# Patient Record
Sex: FEMALE | Race: WHITE | NOT HISPANIC OR LATINO | Employment: STUDENT | URBAN - METROPOLITAN AREA
[De-identification: names, ages, dates, MRNs, and addresses within clinical notes are randomized per-mention and may not be internally consistent; named-entity substitution may affect disease eponyms.]

---
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
## 2024-02-02 ENCOUNTER — TELEPHONE (OUTPATIENT)
Dept: FAMILY MEDICINE | Facility: CLINIC | Age: 19
End: 2024-02-02

## 2024-03-25 ENCOUNTER — TELEPHONE (OUTPATIENT)
Dept: FAMILY MEDICINE | Facility: CLINIC | Age: 19
End: 2024-03-25
Payer: COMMERCIAL

## 2024-03-25 NOTE — TELEPHONE ENCOUNTER
Patient Quality Outreach    Patient is due for the following:   Depression  -  PHQ-9 needed and DAP  Physical Well Child Check      Topic Date Due    HPV Vaccine (1 - 3-dose series) Never done    Flu Vaccine (1) 09/01/2023    COVID-19 Vaccine (1 - 2023-24 season) Never done     Chlamydia Screening    Next Steps:   Schedule a Well Child Check    Type of outreach:    Sent letter.      Questions for provider review:    None           Sandra Elias, CMA

## 2024-04-12 ENCOUNTER — HOSPITAL ENCOUNTER (EMERGENCY)
Facility: CLINIC | Age: 19
Discharge: HOME | End: 2024-04-12
Attending: FAMILY MEDICINE | Admitting: FAMILY MEDICINE
Payer: COMMERCIAL

## 2024-04-12 VITALS
HEART RATE: 100 BPM | HEIGHT: 62 IN | DIASTOLIC BLOOD PRESSURE: 84 MMHG | SYSTOLIC BLOOD PRESSURE: 138 MMHG | TEMPERATURE: 99.3 F | OXYGEN SATURATION: 97 % | BODY MASS INDEX: 20.49 KG/M2 | RESPIRATION RATE: 16 BRPM

## 2024-04-12 DIAGNOSIS — Z71.3 RESTRICTED DIET: ICD-10-CM

## 2024-04-12 DIAGNOSIS — R45.851 SUICIDE IDEATION: ICD-10-CM

## 2024-04-12 PROBLEM — F41.1 GENERALIZED ANXIETY DISORDER: Status: ACTIVE | Noted: 2024-04-12

## 2024-04-12 PROBLEM — F33.9 MAJOR DEPRESSION, RECURRENT (H): Status: ACTIVE | Noted: 2024-04-12

## 2024-04-12 LAB
ALBUMIN SERPL BCG-MCNC: 4.7 G/DL (ref 3.5–5.2)
ALBUMIN UR-MCNC: NEGATIVE MG/DL
ALP SERPL-CCNC: 58 U/L (ref 40–150)
ALT SERPL W P-5'-P-CCNC: 16 U/L (ref 0–50)
AMORPH CRY #/AREA URNS HPF: ABNORMAL /HPF
AMPHETAMINES UR QL SCN: NORMAL
ANION GAP SERPL CALCULATED.3IONS-SCNC: 10 MMOL/L (ref 7–15)
APAP SERPL-MCNC: <5 UG/ML (ref 10–30)
APPEARANCE UR: ABNORMAL
AST SERPL W P-5'-P-CCNC: 22 U/L (ref 0–35)
BACTERIA #/AREA URNS HPF: ABNORMAL /HPF
BARBITURATES UR QL SCN: NORMAL
BASOPHILS # BLD AUTO: 0 10E3/UL (ref 0–0.2)
BASOPHILS NFR BLD AUTO: 0 %
BENZODIAZ UR QL SCN: NORMAL
BILIRUB SERPL-MCNC: 0.3 MG/DL
BILIRUB UR QL STRIP: NEGATIVE
BUN SERPL-MCNC: 6.5 MG/DL (ref 6–20)
BZE UR QL SCN: NORMAL
CALCIUM SERPL-MCNC: 9.4 MG/DL (ref 8.6–10)
CANNABINOIDS UR QL SCN: NORMAL
CHLORIDE SERPL-SCNC: 101 MMOL/L (ref 98–107)
COLOR UR AUTO: YELLOW
CREAT SERPL-MCNC: 0.55 MG/DL (ref 0.51–0.95)
DEPRECATED HCO3 PLAS-SCNC: 27 MMOL/L (ref 22–29)
EGFRCR SERPLBLD CKD-EPI 2021: >90 ML/MIN/1.73M2
EOSINOPHIL # BLD AUTO: 0.1 10E3/UL (ref 0–0.7)
EOSINOPHIL NFR BLD AUTO: 2 %
ERYTHROCYTE [DISTWIDTH] IN BLOOD BY AUTOMATED COUNT: 12.4 % (ref 10–15)
ETHANOL SERPL-MCNC: <0.01 G/DL
FENTANYL UR QL: NORMAL
GLUCOSE SERPL-MCNC: 105 MG/DL (ref 70–99)
GLUCOSE UR STRIP-MCNC: NEGATIVE MG/DL
HCG SERPL QL: NEGATIVE
HCT VFR BLD AUTO: 42 % (ref 35–47)
HGB BLD-MCNC: 14.2 G/DL (ref 11.7–15.7)
HGB UR QL STRIP: NEGATIVE
IMM GRANULOCYTES # BLD: 0 10E3/UL
IMM GRANULOCYTES NFR BLD: 0 %
KETONES UR STRIP-MCNC: NEGATIVE MG/DL
LEUKOCYTE ESTERASE UR QL STRIP: NEGATIVE
LIPASE SERPL-CCNC: 22 U/L (ref 13–60)
LYMPHOCYTES # BLD AUTO: 1.3 10E3/UL (ref 0.8–5.3)
LYMPHOCYTES NFR BLD AUTO: 23 %
MAGNESIUM SERPL-MCNC: 2.3 MG/DL (ref 1.7–2.3)
MCH RBC QN AUTO: 30.5 PG (ref 26.5–33)
MCHC RBC AUTO-ENTMCNC: 33.8 G/DL (ref 31.5–36.5)
MCV RBC AUTO: 90 FL (ref 78–100)
MONOCYTES # BLD AUTO: 0.5 10E3/UL (ref 0–1.3)
MONOCYTES NFR BLD AUTO: 8 %
MUCOUS THREADS #/AREA URNS LPF: PRESENT /LPF
NEUTROPHILS # BLD AUTO: 3.7 10E3/UL (ref 1.6–8.3)
NEUTROPHILS NFR BLD AUTO: 66 %
NITRATE UR QL: NEGATIVE
NRBC # BLD AUTO: 0 10E3/UL
NRBC BLD AUTO-RTO: 0 /100
OPIATES UR QL SCN: NORMAL
PCP QUAL URINE (ROCHE): NORMAL
PH UR STRIP: 7 [PH] (ref 5–7)
PHOSPHATE SERPL-MCNC: 3.6 MG/DL (ref 2.5–4.5)
PLATELET # BLD AUTO: 335 10E3/UL (ref 150–450)
POTASSIUM SERPL-SCNC: 3.8 MMOL/L (ref 3.4–5.3)
PROT SERPL-MCNC: 7.7 G/DL (ref 6.4–8.3)
RBC # BLD AUTO: 4.66 10E6/UL (ref 3.8–5.2)
RBC URINE: 0 /HPF
SALICYLATES SERPL-MCNC: <0.3 MG/DL
SODIUM SERPL-SCNC: 138 MMOL/L (ref 135–145)
SP GR UR STRIP: 1.02 (ref 1–1.03)
SQUAMOUS EPITHELIAL: 9 /HPF
UROBILINOGEN UR STRIP-MCNC: 2 MG/DL
WBC # BLD AUTO: 5.6 10E3/UL (ref 4–11)
WBC URINE: 4 /HPF

## 2024-04-12 PROCEDURE — 36415 COLL VENOUS BLD VENIPUNCTURE: CPT | Performed by: FAMILY MEDICINE

## 2024-04-12 PROCEDURE — 93005 ELECTROCARDIOGRAM TRACING: CPT | Performed by: FAMILY MEDICINE

## 2024-04-12 PROCEDURE — 82040 ASSAY OF SERUM ALBUMIN: CPT | Performed by: FAMILY MEDICINE

## 2024-04-12 PROCEDURE — 81001 URINALYSIS AUTO W/SCOPE: CPT | Mod: XU | Performed by: FAMILY MEDICINE

## 2024-04-12 PROCEDURE — 84703 CHORIONIC GONADOTROPIN ASSAY: CPT | Performed by: FAMILY MEDICINE

## 2024-04-12 PROCEDURE — 83735 ASSAY OF MAGNESIUM: CPT | Performed by: FAMILY MEDICINE

## 2024-04-12 PROCEDURE — 99285 EMERGENCY DEPT VISIT HI MDM: CPT | Mod: 25 | Performed by: FAMILY MEDICINE

## 2024-04-12 PROCEDURE — 82077 ASSAY SPEC XCP UR&BREATH IA: CPT | Performed by: FAMILY MEDICINE

## 2024-04-12 PROCEDURE — 85025 COMPLETE CBC W/AUTO DIFF WBC: CPT | Performed by: FAMILY MEDICINE

## 2024-04-12 PROCEDURE — 83690 ASSAY OF LIPASE: CPT | Performed by: FAMILY MEDICINE

## 2024-04-12 PROCEDURE — 99285 EMERGENCY DEPT VISIT HI MDM: CPT | Performed by: FAMILY MEDICINE

## 2024-04-12 PROCEDURE — 80143 DRUG ASSAY ACETAMINOPHEN: CPT | Performed by: FAMILY MEDICINE

## 2024-04-12 PROCEDURE — 80179 DRUG ASSAY SALICYLATE: CPT | Performed by: FAMILY MEDICINE

## 2024-04-12 PROCEDURE — 84100 ASSAY OF PHOSPHORUS: CPT | Performed by: FAMILY MEDICINE

## 2024-04-12 PROCEDURE — 80307 DRUG TEST PRSMV CHEM ANLYZR: CPT | Performed by: FAMILY MEDICINE

## 2024-04-12 PROCEDURE — 93010 ELECTROCARDIOGRAM REPORT: CPT | Performed by: FAMILY MEDICINE

## 2024-04-12 RX ORDER — VENLAFAXINE HYDROCHLORIDE 75 MG/1
75 CAPSULE, EXTENDED RELEASE ORAL DAILY
Status: ON HOLD | COMMUNITY
Start: 2024-01-10 | End: 2024-05-01

## 2024-04-12 ASSESSMENT — ENCOUNTER SYMPTOMS
HEADACHES: 0
FREQUENCY: 0
VOMITING: 0
ABDOMINAL PAIN: 0
FEVER: 0
DIARRHEA: 0
SORE THROAT: 0
CHILLS: 0
CONSTIPATION: 0
SINUS PRESSURE: 0
WHEEZING: 0
COUGH: 0
DYSPHORIC MOOD: 1
BLOOD IN STOOL: 0
DYSURIA: 0
DIAPHORESIS: 0
PALPITATIONS: 0
SHORTNESS OF BREATH: 0
NAUSEA: 0

## 2024-04-12 ASSESSMENT — COLUMBIA-SUICIDE SEVERITY RATING SCALE - C-SSRS
4. HAVE YOU HAD THESE THOUGHTS AND HAD SOME INTENTION OF ACTING ON THEM?: NO
2. HAVE YOU ACTUALLY HAD ANY THOUGHTS OF KILLING YOURSELF IN THE PAST MONTH?: YES
3. HAVE YOU BEEN THINKING ABOUT HOW YOU MIGHT KILL YOURSELF?: YES
5. HAVE YOU STARTED TO WORK OUT OR WORKED OUT THE DETAILS OF HOW TO KILL YOURSELF? DO YOU INTEND TO CARRY OUT THIS PLAN?: YES
1. IN THE PAST MONTH, HAVE YOU WISHED YOU WERE DEAD OR WISHED YOU COULD GO TO SLEEP AND NOT WAKE UP?: YES
6. HAVE YOU EVER DONE ANYTHING, STARTED TO DO ANYTHING, OR PREPARED TO DO ANYTHING TO END YOUR LIFE?: NO

## 2024-04-12 ASSESSMENT — ACTIVITIES OF DAILY LIVING (ADL)
ADLS_ACUITY_SCORE: 35

## 2024-04-12 NOTE — ED TRIAGE NOTES
"Patient reports suicidal ideation with plan \"overdose on pills.\"  Patient reports having session with therapist and being advised to come in.  Patient tearful.  Reports \"restricting food\" for approximately 1 month, losing 15lbs.  States it is both intentional and reports being \"tired without appetite.\"  Some days does not eat at all, other days will eat an \"avocado or egg.\"  No previous suicide attempt, has history of cutting, has not cut self in approximately 2 months     Triage Assessment (Adult)       Row Name 04/12/24 1637          Triage Assessment    Airway WDL WDL        Respiratory WDL    Respiratory WDL WDL        Skin Circulation/Temperature WDL    Skin Circulation/Temperature WDL WDL        Cardiac WDL    Cardiac WDL WDL        Peripheral/Neurovascular WDL    Peripheral Neurovascular WDL WDL        Cognitive/Neuro/Behavioral WDL    Cognitive/Neuro/Behavioral WDL WDL                     "

## 2024-04-12 NOTE — ED PROVIDER NOTES
History     Chief Complaint   Patient presents with    Suicidal     Suicidal ideation     HPI  Mariana Blake is a 18 year old female who presents with a longer standing history of depressed mood and sees a therapist.  On chronic venlafaxine just recently started.  She was seen by her therapist today remotely and was recommended for evaluation in the emergency department for suicidality.  She had thought about taking pills.  These pills that included Tylenol PM.  We discussed that that would be a very dangerous plan and we discussed why.  There are also apparently weapons in the home that can be locked away per her mother.  She is now tried to commit suicide before but she does have a history of cutting behavior and self injury.  She also notes that in the last months she has had in addition to decreased appetite restrictive eating losing as much is 15 pounds.  We have no weights in our system since May 2023 to compare.  She has been feeling more tired and lack of appetite.  Some days she will eat only part of an avocado or egg.  She has not as of yet taken anything to harm herself.  She does use vaping.  She has used marijuana in the past.  She has used alcohol occasionally.  She is not sexually active.  She has not had no triggering events.  There is no significant new stressors at home or at school that would have worsened her current mood.  She has no hallucinations.  She does feel impulses to harm herself by cutting but she is able to resist those impulses.        Wt Readings from Last 5 Encounters:   05/15/23 50.8 kg (112 lb) (23%, Z= -0.75)*   11/02/12 21.6 kg (47 lb 9.9 oz) (16%, Z= -1.01)*   03/14/12 19.1 kg (42 lb 1.7 oz) (8%, Z= -1.43)*   09/09/06 9.979 kg (22 lb) (26%, Z= -0.65)      * Growth percentiles are based on CDC (Girls, 2-20 Years) data.       Growth percentiles are based on WHO (Girls, 0-2 years) data.         Allergies:  No Known Allergies    Problem List:    Patient Active Problem List  "   Diagnosis Date Noted    Inflammation of eyelid 09/09/2006     Priority: Medium     Problem list name updated by automated process. Provider to review          Past Medical History:    Past Medical History:   Diagnosis Date    NO ACTIVE PROBLEMS        Past Surgical History:    Past Surgical History:   Procedure Laterality Date    NO HISTORY OF SURGERY         Family History:    Family History   Problem Relation Age of Onset    Diabetes Father         type one       Social History:  Marital Status:  Single [1]  Social History     Tobacco Use    Smoking status: Never     Passive exposure: Never    Smokeless tobacco: Never   Vaping Use    Vaping status: Never Used   Substance Use Topics    Alcohol use: No    Drug use: No        Medications:    venlafaxine (EFFEXOR XR) 37.5 MG 24 hr capsule  ACETAMINOPHEN  CHILDRENS MOTRIN OR  IBUPROFEN          Review of Systems   Constitutional:  Negative for chills, diaphoresis and fever.   HENT:  Negative for ear pain, sinus pressure and sore throat.    Eyes:  Negative for visual disturbance.   Respiratory:  Negative for cough, shortness of breath and wheezing.    Cardiovascular:  Negative for chest pain and palpitations.   Gastrointestinal:  Negative for abdominal pain, blood in stool, constipation, diarrhea, nausea and vomiting.   Genitourinary:  Negative for dysuria, frequency and urgency.   Skin:  Negative for rash.   Neurological:  Negative for headaches.   Psychiatric/Behavioral:  Positive for dysphoric mood.    All other systems reviewed and are negative.      Physical Exam   BP: 138/84  Pulse: 100  Temp: 99.3  F (37.4  C)  Resp: 16  Height: 157.5 cm (5' 2\")  SpO2: 97 %      Physical Exam  Constitutional:       General: She is in acute distress.      Appearance: She is not diaphoretic.   HENT:      Head: Atraumatic.   Eyes:      Conjunctiva/sclera: Conjunctivae normal.   Cardiovascular:      Rate and Rhythm: Normal rate and regular rhythm.      Heart sounds: No murmur " heard.  Pulmonary:      Effort: Pulmonary effort is normal. No respiratory distress.      Breath sounds: Normal breath sounds. No stridor. No wheezing or rhonchi.   Abdominal:      General: Abdomen is flat. There is no distension.   Musculoskeletal:      Cervical back: Neck supple.      Right lower leg: No edema.      Left lower leg: No edema.   Skin:     Coloration: Skin is not pale.      Findings: No rash.   Neurological:      General: No focal deficit present.      Mental Status: She is alert and oriented to person, place, and time.      Cranial Nerves: No cranial nerve deficit.      Sensory: No sensory deficit.      Motor: No weakness.      Coordination: Coordination normal.   Psychiatric:         Attention and Perception: She is attentive. She does not perceive auditory or visual hallucinations.         Mood and Affect: Mood is depressed. Mood is not anxious or elated. Affect is flat. Affect is not blunt or angry.         Speech: She is communicative. Speech is not rapid and pressured, delayed, slurred or tangential.         Behavior: Behavior is not agitated, slowed, aggressive or hyperactive.         Thought Content: Thought content is not delusional. Thought content includes suicidal ideation. Thought content includes suicidal plan. Thought content does not include homicidal plan.         ED Course        Procedures                EKG Interpretation:      Interpreted by Davion Gambino MD  EKG done at 1711 hrs. demonstrates a sinus rhythm at 75 bpm normal axis.  There is no ST change.  No T wave changes.  Normal R progression and no Q waves.  Normal intervals.  Normal conduction.  No ectopy.  Impression sinus rhythm without significant acute changes.    Critical Care time:  none               Results for orders placed or performed during the hospital encounter of 04/12/24 (from the past 24 hour(s))   CBC with platelets differential    Narrative    The following orders were created for panel order CBC with  platelets differential.  Procedure                               Abnormality         Status                     ---------                               -----------         ------                     CBC with platelets and d...[[id number]]                      Final result                 Please view results for these tests on the individual orders.   Comprehensive metabolic panel   Result Value Ref Range    Sodium 138 135 - 145 mmol/L    Potassium 3.8 3.4 - 5.3 mmol/L    Carbon Dioxide (CO2) 27 22 - 29 mmol/L    Anion Gap 10 7 - 15 mmol/L    Urea Nitrogen 6.5 6.0 - 20.0 mg/dL    Creatinine 0.55 0.51 - 0.95 mg/dL    GFR Estimate >90 >60 mL/min/1.73m2    Calcium 9.4 8.6 - 10.0 mg/dL    Chloride 101 98 - 107 mmol/L    Glucose 105 (H) 70 - 99 mg/dL    Alkaline Phosphatase 58 40 - 150 U/L    AST 22 0 - 35 U/L    ALT 16 0 - 50 U/L    Protein Total 7.7 6.4 - 8.3 g/dL    Albumin 4.7 3.5 - 5.2 g/dL    Bilirubin Total 0.3 <=1.2 mg/dL   Lipase   Result Value Ref Range    Lipase 22 13 - 60 U/L   Magnesium   Result Value Ref Range    Magnesium 2.3 1.7 - 2.3 mg/dL   Phosphorus   Result Value Ref Range    Phosphorus 3.6 2.5 - 4.5 mg/dL   HCG qualitative pregnancy (blood)   Result Value Ref Range    hCG Serum Qualitative Negative Negative   Salicylate level   Result Value Ref Range    Salicylate <0.3   mg/dL   Acetaminophen level   Result Value Ref Range    Acetaminophen <5.0 (L) 10.0 - 30.0 ug/mL   Alcohol level blood   Result Value Ref Range    Alcohol ethyl <0.01 <=0.01 g/dL   CBC with platelets and differential   Result Value Ref Range    WBC Count 5.6 4.0 - 11.0 10e3/uL    RBC Count 4.66 3.80 - 5.20 10e6/uL    Hemoglobin 14.2 11.7 - 15.7 g/dL    Hematocrit 42.0 35.0 - 47.0 %    MCV 90 78 - 100 fL    MCH 30.5 26.5 - 33.0 pg    MCHC 33.8 31.5 - 36.5 g/dL    RDW 12.4 10.0 - 15.0 %    Platelet Count 335 150 - 450 10e3/uL    % Neutrophils 66 %    % Lymphocytes 23 %    % Monocytes 8 %    % Eosinophils 2 %    % Basophils 0 %    %  Immature Granulocytes 0 %    NRBCs per 100 WBC 0 <1 /100    Absolute Neutrophils 3.7 1.6 - 8.3 10e3/uL    Absolute Lymphocytes 1.3 0.8 - 5.3 10e3/uL    Absolute Monocytes 0.5 0.0 - 1.3 10e3/uL    Absolute Eosinophils 0.1 0.0 - 0.7 10e3/uL    Absolute Basophils 0.0 0.0 - 0.2 10e3/uL    Absolute Immature Granulocytes 0.0 <=0.4 10e3/uL    Absolute NRBCs 0.0 10e3/uL       Medications - No data to display    Assessments & Plan (with Medical Decision Making)     MDM:  Mariana Blake is a 18 year old female presenting with suicidal ideation with a plan of taking medications.  These medications would include Tylenol PM which I mention was particularly dangerous and she is does not do that.  She could potentially be home with mother support on a safety plan with more intensive management of both potentially restricted eating as well as suicidality.  Doubtful that hospital stay will be needed.  Will await DEC evaluation.    I spoke with Shavon in DEC .  The patient has a family history of suicidality and her mother and father's family.  They did not know about her suicidality until today.  The patient and her mother and father believe they could keep the patient safe at home without hospital stay by taking away all meds axis and taking away weapons.  Patient would like to try this but understands that she can return at any time for lack of safety and following her safety plan.  She would like to pursue partial hospitalization.  Shavon attempted set this up but there were issues with insurance they will need to contact on Monday.  They understand if there are any barriers they can return to the emergency department or go directly to a facility such as [facility].      I have reviewed the nursing notes.    I have reviewed the findings, diagnosis, plan and need for follow up with the patient.           Medical Decision Making  The patient's presentation was of high complexity (a chronic illness severe  exacerbation, progression, or side effect of treatment).    The patient's evaluation involved:  ordering and/or review of 3+ test(s) in this encounter (see separate area of note for details)    The patient's management necessitated high risk (a decision regarding hospitalization).        New Prescriptions    No medications on file       Final diagnoses:   Suicide ideation - plan for partial hospitalization. yvette is coordinating. see the attached safety plan. do not hesitate to returnm by car or 911 if you are unsafe. remove all access to meds and weapons.    Restricted diet       4/12/2024   [facility] EMERGENCY DEPT       Davion Gambino MD  04/13/24 020

## 2024-04-13 NOTE — CONSULTS
Diagnostic Evaluation Consultation  Crisis Assessment    Patient Name: Mariana Blake  Age:  18 year old  Legal Sex: female  Gender Identity: female  Pronouns:   Race: White  Ethnicity: Not  or   Language: English      Patient was assessed: Virtual: [employer] Crisis Assessment Start Time: 1940 Crisis Assessment Stop Time: 2020  Patient location: [facility] EMERGENCY DEPT                             ED05    Referral Data and Chief Complaint  Mariana Blake presents to the ED with family/friends. Patient is presenting to the ED for the following concerns: Suicidal ideation, Depression, Anxiety, Worsening psychosocial stress.   Factors that make the mental health crisis life threatening or complex are:  Pt is a 18 year old female who presents to ED following recommendation of individual therapist after pt expressed suicidal ideation during session today.  Pt currently endorsing worsening depression, anxiety and suicidal ideation.  Pt admits to thinking of taking an overdose of pills including Tylenol pm that is in the home.  Feels overwhelmed, impaired sleeping, loss of weight, SIB..      Informed Consent and Assessment Methods  Explained the crisis assessment process, including applicable information disclosures and limits to confidentiality, assessed understanding of the process, and obtained consent to proceed with the assessment.  Assessment methods included conducting a formal interview with patient, review of medical records, collaboration with medical staff, and obtaining relevant collateral information from family and community providers when available.  : done     Patient response to interventions: acceptance expressed  Coping skills were attempted to reduce the crisis:  Reading     History of the Crisis   Pt reports mental health struggles for past three years.  Hx of passive suicidal ideation but no attempts.  Has been cutting in past but none for past 2 months.  Feels  "overwhelmed and becomes hopeless.  Hx of individual therapy, medication, day treatment.  No hospitalizations.    Brief Psychosocial History  Family:  Single, Children    Support System:  Parent(s), Sibling(s), Other (specify)  Employment Status:  employed full-time  Source of Income:     Financial Environmental Concerns:     Current Hobbies:  reading  Barriers in Personal Life:  emotional concerns    Significant Clinical History  Current Anxiety Symptoms:  anxious, shortness of breath or racing heart, excessive worry  Current Depression/Trauma:  helplessness, thoughts of death/suicide, impaired decision making, crying or feels like crying, difficulty concentrating, hopelessness, sadness  Current Somatic Symptoms:  excessive worry, anxious  Current Psychosis/Thought Disturbance:     Current Eating Symptoms:  loss of appetite  Chemical Use History:  Alcohol: Social  Opiates: None  Cocaine: None  Marijuana: Occasional  Other Use: None   Past diagnosis:  Anxiety Disorder  Family history:  Anxiety Disorder, Depression, Suicide Attempt  Past treatment:  Individual therapy, Family therapy, Primary Care, Psychiatric Medication Management, Day Treatment  Details of most recent treatment:  Currently seeing individual therapist x2 week for past year, on Effexor  Other relevant history:          Collateral Information  Is there collateral information: Yes     Collateral information name, relationship, phone number:  Kamille Blake  [phone]    What happened today: Called by therapist recommending pt be brought to ED for mental health evaluation     What is different about patient's functioning: Mother states she wasnsn't surprised to hear pt was struggling because she had had a bad day yesterday and \"doesn't do well when she gets overwhelmed\".  Weight loss.     Concern about alcohol/drug use:      What do you think the patient needs:      Has patient made comments about wanting to kill themselves/others: no    If " d/c is recommended, can they take part in safety/aftercare planning:  yes    Additional collateral information:  Discussed supervising pt and locking up all medications     Risk Assessment  [place] Suicide Severity Rating Scale Full Clinical Version:  Suicidal Ideation  Q1 Wish to be Dead (Lifetime): Yes  Q2 Non-Specific Active Suicidal Thoughts (Lifetime): Yes  3. Active Suicidal Ideation with any Methods (Not Plan) Without Intent to Act (Lifetime): Yes  Q4 Active Suicidal Ideation with Some Intent to Act, Without Specific Plan (Lifetime): Yes  Q5 Active Suicidal Ideation with Specific Plan and Intent (Lifetime): Yes  Q6 Suicide Behavior (Lifetime): no     Suicidal Behavior (Lifetime)  Actual Attempt (Lifetime): No  Has subject engaged in non-suicidal self-injurious behavior? (Lifetime): Yes  Interrupted Attempts (Lifetime): No  Aborted or Self-Interrupted Attempt (Lifetime): No  Preparatory Acts or Behavior (Lifetime): Yes  Total Number of Preparatory Acts (Lifetime): 1  Preparatory Acts or Behavior Description (Lifetime): has bottle of tylenol pm in room    [place] Suicide Severity Rating Scale Recent:   Suicidal Ideation (Recent)  Q1 Wished to be Dead (Past Month): yes  Q2 Suicidal Thoughts (Past Month): yes  Q3 Suicidal Thought Method: yes  Q4 Suicidal Intent without Specific Plan: yes  Q5 Suicide Intent with Specific Plan: no  Level of Risk per Screen: high risk  Intensity of Ideation (Recent)  Most Severe Ideation Rating (Past 1 Month): 4  Frequency (Past 1 Month): Daily or almost daily  Duration (Past 1 Month): Less than 1 hour/some of the time  Controllability (Past 1 Month): Can control thoughts with some difficulty  Deterrents (Past 1 Month): Deterrents definitely stopped you from attempting suicide  Reasons for Ideation (Past 1 Month): Mostly to end or stop the pain (You couldn't go on living with the pain or how you were feeling)  Suicidal Behavior (Recent)  Actual Attempt (Past 3 Months): No  Has  subject engaged in non-suicidal self-injurious behavior? (Past 3 Months): Yes  Interrupted Attempts (Past 3 Months): No  Aborted or Self-Interrupted Attempt (Past 3 Months): No  Preparatory Acts or Behavior (Past 3 Months): Yes  Total Number of Preparatory Acts (Past 3 Months): 1    Environmental or Psychosocial Events: other life stressors, work or task failure, social isolation  Protective Factors: Protective Factors: strong bond to family unit, community support, or employment, lives in a responsibly safe and stable environment, good treatment engagement, supportive ongoing medical and mental health care relationships, reality testing ability    Does the patient have thoughts of harming others? Feels Like Hurting Others: no  Previous Attempt to Hurt Others: no  Is the patient engaging in sexually inappropriate behavior?: no    Is the patient engaging in sexually inappropriate behavior?  no        Mental Status Exam   Affect: Appropriate  Appearance: Appropriate  Attention Span/Concentration: Attentive  Eye Contact: Variable    Fund of Knowledge: Appropriate   Language /Speech Content: Fluent  Language /Speech Volume: Normal  Language /Speech Rate/Productions: Normal  Recent Memory: Intact  Remote Memory: Intact  Mood: Depressed, Sad  Orientation to Person: Yes   Orientation to Place: Yes  Orientation to Time of Day: Yes  Orientation to Date: Yes     Situation (Do they understand why they are here?): Yes  Psychomotor Behavior: Normal  Thought Content: Suicidal  Thought Form: Intact     Mini-Cog Assessment  Number of Words Recalled:    Clock-Drawing Test:     Three Item Recall:    Mini-Cog Total Score:       Medication  Psychotropic medications:   Medication Orders - Psychiatric (From admission, onward)      None             Current Care Team  Patient Care Team:  No Ref-Primary, Physician as PCP - Elizabeth Baldwin PA-C as Assigned PCP    Diagnosis  Patient Active Problem List   Diagnosis    Inflammation  of eyelid    Major depression, recurrent (H24)    Generalized anxiety disorder       Primary Problem This Admission  Active Hospital Problems    *Major depression, recurrent (H24)      Generalized anxiety disorder        Clinical Summary and Substantiation of Recommendations   After therapeutic assessment, intervention and aftercare planning by ED care team and LM and in consultation with attending provider, the patient's circumstances and mental state appear appropriate for outpatient programmatic supports.  It is the recommendation of this clinician that pt discharge with plan to schedule Intake appointment for partial hospitalization program.  Pt currently endorsing suicidal ideation without specific plan and intent.  Parents and patient agree to have medications and weapons secured in home with increased supervision of pt.   Voluntary hospitalization was discussed and offered.  Pt to return to ED for IP consideration if suicidal thoughts intensify or include intent and/or specific plan.      Patient coping skills attempted to reduce the crisis:  Reading    Disposition  Recommended disposition: Programmatic Care        Reviewed case and recommendations with attending provider. Attending Name: Dr Gambino       Attending concurs with disposition: yes       Patient and/or validated legal guardian concurs with disposition:   yes       Final disposition:  discharge    Legal status on admission:      Assessment Details   Total duration spent with the patient: 40 min     CPT code(s) utilized: 78491 - Psychotherapy for Crisis - 60 (30-74*) min    Jsoefa Lawrence Psychotherapist  [facility] - Triage & Transition Services  Callback: [phone]

## 2024-04-13 NOTE — ED NOTES
MD Gambino and Dad reviewing discharge instructions. Encourage to clear house of medications and weapons. Verbalized understanding. Encouraged return if symptoms worse, discussed psychiatric hospitals vs tertiary hospitals, verbalized understanding.

## 2024-04-13 NOTE — DISCHARGE INSTRUCTIONS
ICD-10-CM    1. Suicide ideation  R45.851     plan for partial hospitalization. yvette is coordinating. see the attached safety plan. do not hesitate to returnm by car or 911 if you are unsafe. remove all access to meds and weapons.       2. Restricted diet  Z71.3

## 2024-04-13 NOTE — ED NOTES
Mariana MARV Hayden  April 12, 2024  Plan of Care Hand-off Note     Patient Care Path: discharge    Plan for Care:   After therapeutic assessment, intervention and aftercare planning by ED care team and LMHP and in consultation with attending provider, the patient's circumstances and mental state appear appropriate for outpatient programmatic supports.  It is the recommendation of this clinician that pt discharge with plan to schedule Intake appointment for partial hospitalization program.  Pt currently endorsing suicidal ideation without specific plan and intent.  Parents and patient agree to have medications and weapons secured in home with increased supervision of pt.   Voluntary hospitalization was discussed and offered.  Pt to return to ED for IP consideration if suicidal thoughts intensify or include intent and/or specific plan.  No available appt secured today for PHP, pt will be given list of programs to contact.  P to follow up with contacting pt in next few days to offer assistance.    Identified Goals and Safety Issues: Pt with suicidal ideation, hx of SIB    Overview:  Parents Urbano and Briana [phone], [phone]      Legal Status:      Psychiatry Consult:       Updated Dr. Gambino regarding plan of care.           Josefa Lawrence

## 2024-04-14 ENCOUNTER — HEALTH MAINTENANCE LETTER (OUTPATIENT)
Age: 19
End: 2024-04-14

## 2024-04-16 ENCOUNTER — VIRTUAL VISIT (OUTPATIENT)
Dept: BEHAVIORAL HEALTH | Facility: CLINIC | Age: 19
End: 2024-04-16
Payer: COMMERCIAL

## 2024-04-16 ENCOUNTER — TELEPHONE (OUTPATIENT)
Dept: BEHAVIORAL HEALTH | Facility: CLINIC | Age: 19
End: 2024-04-16
Payer: COMMERCIAL

## 2024-04-16 DIAGNOSIS — F33.2 SEVERE RECURRENT MAJOR DEPRESSION WITHOUT PSYCHOTIC FEATURES (H): Primary | ICD-10-CM

## 2024-04-16 ASSESSMENT — PATIENT HEALTH QUESTIONNAIRE - PHQ9
SUM OF ALL RESPONSES TO PHQ QUESTIONS 1-9: 25
10. IF YOU CHECKED OFF ANY PROBLEMS, HOW DIFFICULT HAVE THESE PROBLEMS MADE IT FOR YOU TO DO YOUR WORK, TAKE CARE OF THINGS AT HOME, OR GET ALONG WITH OTHER PEOPLE: EXTREMELY DIFFICULT
SUM OF ALL RESPONSES TO PHQ QUESTIONS 1-9: 25

## 2024-04-16 NOTE — TELEPHONE ENCOUNTER
**Patient Navigator Follow Up**    4/16/2024;    Referral for PHP    Follow up Requests:  Patient Navigator Coordinator Follow-Up Needed: Other:  The patient would like to begin PHP as soon as possible.   She is also at high risk of further decompensation.    She doesn't think that it will be feasible to travel from [place] to [facility].  She would like assistance with either transportation or finding a PHP     I called and spoke to patient about programming.  She said she needs transportation.    I informed her I will have our in-house  Eliana Schaeffer reach out to her to work with her and assist with securing Transportation to programming.    *Referral form completed and Eliana FAIR ([facility]) will help patient with Transportation piece and [place] back    4/17/2024;    Eliana spoke to patient and Mother. See notes in Epic.  Patient will follow up accordingly.      Thank you,     Latanya HARMAN  Patient Navigator

## 2024-04-16 NOTE — TELEPHONE ENCOUNTER
Summary of Patient Care Communication Handoff to Patient Navigator Coordinator    PATIENT'S NAME: Mariana Blake  MRN:   [id number]  :   2005    DATE OF SERVICE: 24    Referral Needed: Yes    Is the patient coming from an inpatient unit? No    What program is this referral for? Adult Mental Health Referral    Level of Care Recommended:  Partial Hospitalization Program (PHP)    Specialty Track Recommendations:  MH Specialty Tracks: Young Adult    Schedule Preferences: Schedule Preference:  No schedule preference (Mornings or Afternoons)    Are there any potential barriers for entrance into programmatic care? Transportation    Specialty Care Coordinator Referral Needed:  No    Mental Health Referral Needed: No    Release of Information Needed:  defer    Faxing Needed: n/a    Follow up Requests:  Patient Navigator Coordinator Follow-Up Needed: Other:  The patient would like to begin PHP as soon as possible.   She is also at high risk of further decompensation.    She doesn't think that it will be feasible to travel from [place] to [facility].  She would like assistance with either transportation or finding a PHP     Comments: DA is almost complete     Radha Smith Psy.D, MANNY        Patient Navigator Coordinator Contact Information  Pool Message: [email or web address] ([phone])   Phone:  [phone]  Fax:  [phone]  Email:  [email or web address]

## 2024-04-16 NOTE — PROGRESS NOTES
"    [facility]         PATIENT'S NAME: Mariana Blake  PREFERRED NAME: Mariana  PRONOUNS:   she / her     MRN: [id number]  : 2005  ADDRESS: [address] [place] [address] [address]  [facility] [address]  ACCT. NUMBER:  [id number]  DATE OF SERVICE: 24  START TIME: 0935  END TIME: 1026  PREFERRED PHONE: [phone]  May we leave a program related message: Yes  EMERGENCY CONTACT: was obtained - Father .  SERVICE MODALITY:  Video Visit:    51 min   Provider verified identity through the following two step process.  Patient provided:  Patient  and Patient address    Telemedicine Visit: The patient's condition can be safely assessed and treated via synchronous audio and visual telemedicine encounter.      Reason for Telemedicine Visit: Patient has requested telehealth visit    Originating Site (Patient Location): Patient's home    Distant Site (Provider Location): Provider Remote Setting- Home Office    Consent:  The patient/guardian has verbally consented to: the potential risks and benefits of telemedicine (video visit) versus in person care; bill my insurance or make self-payment for services provided; and responsibility for payment of non-covered services.     Patient would like the video invitation sent by:  My Chart    Mode of Communication:  Video Conference via [facility]    Distant Location (Provider):  Off-site    As the provider I attest to compliance with applicable laws and regulations related to telemedicine.    UNIVERSAL ADULT Mental Health DIAGNOSTIC ASSESSMENT    Identifying Information:  Patient is a 18 year old,   individual.  Patient was referred for an assessment by [facility] ED and individual therapist.  Patient attended the session with Mom  .    Chief Complaint:   The reason for seeking services at this time is: \" I had an appt with my therapist Friday and she told me to go in \"   The problem(s) began: \"I can't remember.\" Patient has attempted to resolve these " "concerns in the past through therapist and medication, day treatment (one year ago)  .     Used to take venlafaxine - hasn't taken since last week.  States, \"I haven't done anything.  I don't know why I am not taking it.\"    Per ED visit note :   Mariana Blake is a 18 year old female who presents with a longer standing history of depressed mood and sees a therapist.  On chronic venlafaxine just recently started.  She was seen by her therapist today remotely and was recommended for evaluation in the emergency department for suicidality.  She had thought about taking pills.  These pills that included Tylenol PM.  We discussed that that would be a very dangerous plan and we discussed why.  There are also apparently weapons in the home that can be locked away per her mother.  She is now tried to commit suicide before but she does have a history of cutting behavior and self injury.  She also notes that in the last months she has had in addition to decreased appetite restrictive eating losing as much is 15 pounds.  We have no weights in our system since May 2023 to compare.  She has been feeling more tired and lack of appetite.  Some days she will eat only part of an avocado or egg.  She has not as of yet taken anything to harm herself.  She does use vaping.  She has used marijuana in the past.  She has used alcohol occasionally.  She is not sexually active.  She has not had no triggering events.  There is no significant new stressors at home or at school that would have worsened her current mood.  She has no hallucinations.  She does feel impulses to harm herself by cutting but she is able to resist those impulses.     Sees Josué Medeiros for Therapy @[facility] [place], psychiatry @[facility]     Social/Family History:  Patient reported they grew up in  [place], [place].  They were raised by biological parents.  Parents stayed .  She has one sister, 21 " "and one brother, 15.   Patient reported that their childhood was \"normal, I don't know, I don't really remember it.\" Patient described their current relationships with family of origin as \"fine.\"    The patient describes their cultural background as \"I don't know. White.\"  Cultural influences and impact on patient's life structure, values, norms, and healthcare:  none  .  Contextual influences on patient's health include: none noted.  Cultural, Contextual, and socioeconomic factors do not affect the patient's access to services. Patient identified their preferred language to be English. Patient reported they do not  need the assistance of an  or other support involved in therapy.     Patient reported had no significant delays in developmental tasks.   Patient's highest education level was some college. Patient identified the following learning problems: none reported.  Modifications will not be used to assist communication in therapy.   Patient reports they are  able to understand written materials.    Patient reported the following relationship history : has not been in a relationship.  Patient's current relationship status is single.    Patient identified their sexual orientation as : did not discuss.  Patient reported having zero child(dipika). Patient identified mother, father, siblings, and therapist as part of their support system.  Patient identified the quality of these relationships as good.     Patient's current living/housing situation involves  live with parents .  They have always lived with their parents and they report that housing is stable.     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 Has been unable to work since Friday morning.    Patient reports their finances are obtained through employment.  Patient does not identify finances as a current stressor.      Patient reported that they have not been involved with the legal system.   Patient denies being on probation / " parole / under the jurisdiction of the court.    Patient's Strengths and Limitations:  Patient identified the following strengths or resources that will help them succeed in treatment: Latter day / Episcopalian, babs / spirituality, and family support. Things that may interfere with the patient's success in treatment include: transportation concerns.    Assessments:  The following assessments were completed by patient for this visit    PHQ9:       4/6/2023     7:46 AM 5/15/2023     8:21 AM 4/16/2024     8:44 AM   PHQ-9 SCORE   PHQ-9 Total Score MyChart   25 (Severe depression)   PHQ-9 Total Score 21 21 25    25     GAD7:       4/6/2023     7:46 AM 4/16/2024     8:42 AM   KHALIF-7 SCORE   Total Score  16 (severe anxiety)   Total Score 14 16     CAGE-AID:       4/16/2024     8:40 AM   CAGE-AID Total Score   Total Score 0    0   Total Score MyChart 0 (A total score of 2 or greater is considered clinically significant)     PROMIS 10-Global Health (all questions and answers displayed):       4/16/2024     8:40 AM   PROMIS 10   In general, would you say your health is: Good   In general, would you say your quality of life is: Good   In general, how would you rate your physical health? Fair   In general, how would you rate your mental health, including your mood and your ability to think? Poor   In general, how would you rate your satisfaction with your social activities and relationships? Poor   In general, please rate how well you carry out your usual social activities and roles Poor   To what extent are you able to carry out your everyday physical activities such as walking, climbing stairs, carrying groceries, or moving a chair? Moderately   In the past 7 days, how often have you been bothered by emotional problems such as feeling anxious, depressed, or irritable? Always   In the past 7 days, how would you rate your fatigue on average? Very severe   In the past 7 days, how would you rate your pain on average, where 0 means no  pain, and 10 means worst imaginable pain? 5   In general, would you say your health is: 3   In general, would you say your quality of life is: 3   In general, how would you rate your physical health? 2   In general, how would you rate your mental health, including your mood and your ability to think? 1   In general, how would you rate your satisfaction with your social activities and relationships? 1   In general, please rate how well you carry out your usual social activities and roles. (This includes activities at home, at work and in your community, and responsibilities as a parent, child, spouse, employee, friend, etc.) 1   To what extent are you able to carry out your everyday physical activities such as walking, climbing stairs, carrying groceries, or moving a chair? 3   In the past 7 days, how often have you been bothered by emotional problems such as feeling anxious, depressed, or irritable? 5   In the past 7 days, how would you rate your fatigue on average? 5   In the past 7 days, how would you rate your pain on average, where 0 means no pain, and 10 means worst imaginable pain? 5   Global Mental Health Score 6    6   Global Physical Health Score 9    9   PROMIS TOTAL - SUBSCORES 15    15     [place] Suicide Severity Rating Scale (Lifetime/Recent)      4/12/2024     4:46 PM 4/12/2024     8:23 PM 4/12/2024     8:40 PM   [place] Suicide Severity Rating (Lifetime/Recent)   Q1 Wish to be Dead (Lifetime)  Yes    Q2 Non-Specific Active Suicidal Thoughts (Lifetime)  Yes    Q1 Wished to be Dead (Past Month) 1-->yes  1-->yes   Q2 Suicidal Thoughts (Past Month) 1-->yes  1-->yes   Q3 Suicidal Thought Method 1-->yes  1-->yes   Q4 Suicidal Intent without Specific Plan 0-->no  1-->yes   Q5 Suicide Intent with Specific Plan 1-->yes  0-->no   Q6 Suicide Behavior (Lifetime) 0-->no 0-->no    Level of Risk per Screen high risk  high risk   3. Active Suicidal Ideation with any Methods (Not Plan) Without Intent to Act  (Lifetime)  Y    Q4 Active Suicidal Ideation with Some Intent to Act, Without Specific Plan (Lifetime)  Y    Q5 Active Suicidal Ideation with Specific Plan and Intent (Lifetime)  Y    Most Severe Ideation Rating (Past 1 Month)   4   Frequency (Past 1 Month)   4   Duration (Past 1 Month)   2   Controllability (Past 1 Month)   3   Deterrents (Past 1 Month)   1   Reasons for Ideation (Past 1 Month)   4   Actual Attempt (Lifetime)  N    Actual Attempt (Past 3 Months)   N   Has subject engaged in non-suicidal self-injurious behavior? (Lifetime)  Y    Has subject engaged in non-suicidal self-injurious behavior? (Past 3 Months)   Y   Interrupted Attempts (Lifetime)  N    Interrupted Attempts (Past 3 Months)   N   Aborted or Self-Interrupted Attempt (Lifetime)  N    Aborted or Self-Interrupted Attempt (Past 3 Months)   N   Preparatory Acts or Behavior (Lifetime)  Y    Total Number of Preparatory Acts (Lifetime)  1    Preparatory Acts or Behavior Description (Lifetime)  has bottle of tylenol pm in room    Preparatory Acts or Behavior (Past 3 Months)   Y   Total Number of Preparatory Acts (Past 3 Months)   1   Calculated C-SSRS Risk Score (Lifetime/Recent)  Moderate Risk High Risk     LOCUS Worksheet:   LOCUS Worksheet     Name: Mariana Blake MRN: [id number]    : 2005      Gender:  female    PMI:     Provider Name: Radha Mccray PsyD, LP    Provider NPI:  [id number]    Actual level of Care Provided:  Diagnostic Assessment     Service(s) receiving or referred to:  PHP    Reason for Variance:       Rating completed by: Radha Mccray PsyD, LP       I. Risk of Harm:   4      Serious Risk of Harm    II. Functional Status:   4      Serious Impairment    III. Co-Morbidity:   1      No Co-Morbidity    IV - A. Recovery Environment - Level of Stress:   3      Moderately Stress Environment    IV - B. Recovery Environment - Level of Support:   2      Supportive Environment    V. Treatment and Recovery  "History:   3      Moderate to Equivocal Response to Treatment and Recovery Management    VI. Engagement and Recovery Project:   3      Limited Engagement and Recovery       20 Composite Score    Level of Care Recommendation:   20 to 22       Medically Monitored Non-Residential Services    Personal and Family Medical History:  Patient does report a family history of mental health concerns.  Patient reports family history includes Diabetes in her father.  Mom - depression   Dad- anxiety  Aunt and Uncles - anxiety and depression     Patient does report Mental Health Diagnosis and/or Treatment.  Patient reported the following previous diagnoses which include(s): an anxiety disorder; depression .  Patient reported symptoms began : per Mom, around age 16.  Patient has received mental health services in the past:  therapy  .  Psychiatric Hospitalizations: none.  Patient denies a history of civil commitment.      Currently, patient is receiving other mental health services.  These include psychotherapy with at [facility]  .       Patient has had a physical exam to rule out medical causes for current  symptoms.  Date of last physical exam was within the past year. Client was encouraged to follow up with PCP if symptoms were to develop. The patient  goes to [facility] for their care but was recently seen by [facility] provider              .  Patient reports no current medical concerns and no current dental concerns.  Patient denies any issues with pain..   There are significant appetite / nutritional concerns / weight changes. These may include: low appetite and loss of weight. Patient reports the following sleep concerns:  \"I am mostly sleeping\" .   Patient does not report a history of head injury / trauma / cognitive impairment.      Patient reports current meds as:   Current Outpatient Medications   Medication Sig Dispense Refill    ACETAMINOPHEN       CHILDRENS MOTRIN OR None Entered (Patient not taking: " "Reported on 4/6/2023)      IBUPROFEN       venlafaxine (EFFEXOR XR) 37.5 MG 24 hr capsule Take 75 mg by mouth       No current facility-administered medications for this visit.       Medication Adherence:  Patient reports not taking psychiatric medications as prescribed. Client states reason for medication non-adherence as \"I don't know.  I am not doing anything\" . Strategies for addressing obstacles to medication adherence include f/up with psychiatry, ask for help / support with medications . Client  .    Patient Allergies:  No Known Allergies    Medical History:    Past Medical History:   Diagnosis Date    NO ACTIVE PROBLEMS          Current Mental Status Exam:   Appearance:  Disheveled    Eye Contact:  Fair   Psychomotor:  Retarded (Slowed)       Gait / station:  Did not observe  Attitude / Demeanor: Cooperative  Indifferent  Speech      Rate / Production: Slow       Volume:  Soft  volume      Language:  no obvious problem  Mood:   Depressed  Sad  Dysphoric  Affect:   Blunted    Thought Content: Clear   Thought Process: Coherent       Associations: No loosening of associations  Insight:   Fair   Judgment:  Intact   Orientation:  All  Attention/concentration: Fair    Substance Use:   Patient did not report a family history of substance use concerns; see medical history section for details.  Patient has not received chemical dependency treatment in the past.  Patient has not ever been to detox.      Patient is not currently receiving any chemical dependency treatment.           Substance History of use Age of first use Date of last use     Pattern and duration of use (include amounts and frequency)   Alcohol used in the past   15 03/15/24 Has not used since 3/15/2024   Cannabis   never used          Amphetamines   never used        Cocaine/crack    never used          Hallucinogens never used            Inhalants never used            Heroin never used            Other Opiates never used        Benzodiazepine   " never used        Barbiturates never used        Over the counter meds never used        Caffeine used in the past 14      Nicotine  never used        Other substances not listed above:  Identify:  never used          Patient reported the following problems as a result of their substance use: no problems, not applicable.    Substance Use: No symptoms    Based on the negative CAGE score and clinical interview there  are not indications of drug or alcohol abuse.    Significant Losses / Trauma / Abuse / Neglect Issues:   Patient did not serve in the .  There are indications or report of significant loss, trauma, abuse or neglect issues related to: are no indications and client denies any losses, trauma, abuse, or neglect concerns.  Concerns for possible neglect are not present.     Safety Assessment:   Patient denies current homicidal ideation and behaviors.  Patient denies current self-injurious ideation and behaviors.  *Last time she self injured by cutting was two months ago  Patient denied risk behaviors associated with substance use.   Patient denies any high risk behaviors associated with mental health symptoms.  Patient reports the following current concerns for their personal safety: None.  Patient reports there are firearms in the house.     yes, they are secured.     History of Safety Concerns:  Patient denied a history of homicidal ideation.     Patient denied a history of personal safety concerns.    Patient denied a history of assaultive behaviors.    Patient denied a history of sexual assault behaviors.     Patient denied a history of risk behaviors associated with substance use.  Patient denies any history of high risk behaviors associated with mental health symptoms.  Patient reports the following protective factors: dedication to family or friends    Risk Plan:  See Recommendations for Safety and Risk Management Plan    Review of Symptoms per patient report:   Depression: Change in sleep, Lack  of interest, Excessive or inappropriate guilt, Change in energy level, Difficulties concentrating, Change in appetite, Psychomotor slowing or agitation, Suicidal ideation, Feelings of hopelessness, Feelings of helplessness, Low self-worth, Ruminations, Irritability, Feeling sad, down, or depressed, Withdrawn, Poor hygeine, Frequent crying, and Anger outbursts  Gricelda:  No Symptoms  Psychosis: No Symptoms  Anxiety: Excessive worry, Nervousness, Physical complaints, such as headaches, stomachaches, muscle tension, Social anxiety, Sleep disturbance, Psychomotor agitation, Ruminations, Poor concentration, Irritability, and Anger outbursts  Panic:  Panic symptoms include the following and occur 2x in past one month and last approximately 15 minutes:, Palpitations, Shortness of breath, and Sense of impending doom  Post Traumatic Stress Disorder:  No Symptoms   Eating Disorder: Restriction and Weight change  ADD / ADHD:  Poor task completion and Poor organizational skills  Conduct Disorder: No symptoms  Autism Spectrum Disorder: No symptoms  Obsessive Compulsive Disorder: Checking    Patient reports the following compulsive behaviors and treatment history: Picking - has not had treatment..      Weight loss - 95 lb   5'2         Diagnostic Criteria:   Major Depressive Disorder  CRITERIA (A-C) REPRESENT A MAJOR DEPRESSIVE EPISODE - SELECT THESE CRITERIA  A) Recurrent episode(s) - symptoms have been present during the same 2-week period and represent a change from previous functioning 5 or more symptoms (required for diagnosis)   - Depressed mood. Note: In children and adolescents, can be irritable mood.     - Diminished interest or pleasure in all, or almost all, activities.    - Significant weight loss.    - Increased sleep.    - Psychomotor activity retardation.    - Fatigue or loss of energy.    - Feelings of worthlessness or inappropriate and excessive guilt.    - Diminished ability to think or concentrate, or  indecisiveness.    - Recurrent thoughts of death (not just fear of dying), recurrent suicidal ideation without a specific plan, or a suicide attempt or a specific plan for committing suicide.   B) The symptoms cause clinically significant distress or impairment in social, occupational, or other important areas of functioning  C) The episode is not attributable to the physiological effects of a substance or to another medical condition  D) The occurence of major depressive episode is not better explained by other thought / psychotic disorders  E) There has never been a manic episode or hypomanic episode    Functional Status:  Patient reports the following functional impairments:  childcare / parenting, health maintenance, home life with interacting with others, managing responsibilities, management of the household and or completion of tasks, organization, relationship(s), self-care, social interactions, and work / vocational responsibilities.     Programmatic care:  Current LOCUS was assigned and patient needs the following level of care based on score 20  .    Clinical Summary:  1. Psychosocial, Cultural and Contextual Factors: n/a   2. Principal DSM5 Diagnoses  (Sustained by DSM5 Criteria Listed Above):   296.33 (F33.2) Major Depressive Disorder, Recurrent Episode, Severe _.  3. Other Diagnoses that is relevant to services:   300.00 (F41.9) Unspecified Anxiety Disorder.  4. Provisional Diagnosis:  n/a    5. Prognosis: Expect Improvement and Relieve Acute Symptoms.  6. Likely consequences of symptoms if not treated: decline in functioning, increase in symptoms, .  7. Client strengths include:  has a previous history of therapy, support of family, friends and providers, and work history .     Recommendations:     1. Plan for Safety and Risk Management:   Safety and Risk: Patient agreed to use safety plan developed on 4/12/2024 if any safety concerns arise.  She has a copy of the plan      Report to child / adult  protection services was NA.     2. Patient's identified no identified cultural concerns.     3. Initial Treatment will focus on:    Depressed Mood - safety and symptom management / improvement, return to baseline  .     4. Resources/Service Plan:    services are not indicated.   Modifications to assist communication are not indicated.   Additional disability accommodations are not indicated.      5. Collaboration:   Collaboration / coordination of treatment will be initiated with the following  support professionals: defer to treatment team, consider collaboration with the patient's individual therapist.      6.  Referrals:   The following referral(s) will be initiated: Partial Hospitalization Program.       A Release of Information has been obtained for the following: defer to treatment team .     Clinical Substantiation/medical necessity for the above recommendations:  Patient is a 19 yo female who presented for a diagnostic assessment in order to participate in programmatic care.  She was seen in the ED for increasing suicidal ideation on 4/12/2024.    The patient agrees to follow safety plan and has no active intention or plan to harm herself.   She is experiencing significant functional impairment in the areas of medication compliance, completing IADL, attending work and interacting with others.  Outpatient treatment interventions have not adequately addressed the patient's symptoms or functional impairments and therefore a higher level of care is needed.    The patient meets the criteria for PHP and this is recommended in order to prevent further decompensation and improve functional status.     7. ELIZABETH:    ELIZABETH:  Did not discuss.     8. Records:   These were reviewed at time of assessment.   Information in this assessment was obtained from the medical record and  provided by patient and family who is a fair historian.    Patient will have open access to their mental health medical record.    9.    Interactive Complexity: No    10. Safety Plan:   [place] Safety Plan      Creation Date: 4/12/24       Step 1: Warning signs:    Warning Signs    Feeling hopeless, helpless, overwhelmed    Urge to self-harm      Step 2: Internal coping strategies - Things I can do to take my mind off my problems without contacting another person:    Strategies    Reading.    try mindfulness activities (see handouts)      Step 3: People and social settings that provide distraction:    Name Contact Information    Aunts, Uncles, Sister          Step 4: People whom I can ask for help during a crisis:    Name Contact Information    Sister     Parents       Step 5: Professionals or agencies I can contact during a crisis:    Clinician/Agency Name Phone Emergency Contact    [facility]        Suicide Prevention Lifeline Phone: Call or Text 926  Crisis Text Line: Text HOME to 111728     Step 6: Making the environment safer (plan for lethal means safety):   Tylenol pm in home - parents and pt to find medications in home and put in locked cabinet     Optional: What is most important to me and worth living for?:      Khalida Safety Plan. Guerda Roberts and Philip Uriostegui. Used with permission of the authors.         Provider Name/ Credentials:  Radha Mccray PsyD    April 16, 2024

## 2024-04-17 ENCOUNTER — TELEPHONE (OUTPATIENT)
Dept: BEHAVIORAL HEALTH | Facility: CLINIC | Age: 19
End: 2024-04-17
Payer: COMMERCIAL

## 2024-04-17 NOTE — TELEPHONE ENCOUNTER
[facility]       Referral Date: 4/16/2024    Reason for Referral:  Transportation services    Referral Status: (urgent or general)  Urgent    Method of Communication:   Phone call to patient    Plan or goal of contact/ previous contact information:   X1 phone call    Follow up required:   No - unless patient is unable to secure transportation to and from [facility] programming with given resources.     Work done on case:   SW spoke with patient about transportation resources that may be able to assist with getting her to and from [facility] programming. SW recommended patient contact her insurance to inquire about transportation services. SW provided information on a few other options. Patient will contact SW if transportation is unavailable and/or if she needs external programming options.     Important Information and next steps:   [employer] Transportation  https://www.[employer].org/[employer]--[facility]--[employer]-medical-transportation-(mt)/[email or web address]?zcpbfr=84728    [employer]  https://[employer] Median?utm_source=Sailthru&utm_medium=cpc&utm_campaign=Search-Nonbrand-Health-GLB&utm_keyword=&matchtype=&network=&device=&placement=&utm_adgroup=hospital-transportation-service&utm_content=health&gad_source=1&[id number]    Transit Assistance Program (TAP)  https://www.metrotransit.org/tap-[email or web address]          GILBERTO Aguirre   - [facility]  [facility]  [email or web address]  [phone]

## 2024-04-24 ENCOUNTER — TELEPHONE (OUTPATIENT)
Dept: FAMILY MEDICINE | Facility: CLINIC | Age: 19
End: 2024-04-24
Payer: COMMERCIAL

## 2024-04-24 NOTE — TELEPHONE ENCOUNTER
Patient returned PHQ9 sent by MA 2/2/24.  Score high.  Seeing behavioral health.  L/M for Miley Kanika to call.  Patient states she is trying to get into treatment facility for this currently and her therapist is helping her.   Therapist is at [facility].   is currently safe.  Lives with parents.  Mom got on the phone and states she took a week off of work.  States she has been trying to get her in a treatment program since Friday and no luck and very frustrated.  States TREVINO has done nothing to follow-up since being to the ER.  States she was able to go home because her parents were able to be with her for the weekend and states they were told they would be called the next day.  Did intake appt last Tuesday.  Program called them back and there was one spot in the program and they had to arrange transportation.  Insurance does not have transportation.   called [facility] with other transportation options.  Mom then got her her parents arranged to drive [facility] and mom messaged SW and Shirlene has L/M's and they have not heard back since last Wednesday.  Kristiegiovannajhony also messaged Cindy 4/16/24 and has not heard response on [employer] message.  Mom states her daughter needs help urgently and is not getting help.  I did give mom URIEL [facility] name to look up and see if she felt that would be a helpful option.  Advised I would follow-up tomorrow when behavioral health available.  Mom agrees with plan.  Routing to Chi and triage for follow-up tomorrow.  Farhana Shirley RN             Phq-9 (Phq-9)-Developed By Jo Lindo,Norma Caballero,Jan Izquierdo And Colleagues,With An Educational Chace From [employer] 2002.    Question 4/23/2024  6:42 PM CDT - Filed by Patient   1. Little interest or pleasure in doing things Nearly every day   2.  Feeling down, depressed, or hopeless Nearly every day   3.  Trouble falling or staying asleep, or sleeping too much Nearly every day   4.   Feeling tired or having little energy Nearly every day   5.  Poor appetite or overeating Nearly every day   6.  Feeling bad about yourself - or that you are a failure or have let yourself or your family down Nearly every day   7.  Trouble concentrating on things, such as reading the newspaper or watching television More than half the days   8.  Moving or speaking so slowly that other people could have noticed. Or the opposite - being so fidgety or restless that you have been moving around a lot more than usual Several days   9.  Thoughts that you would be better off dead, or of hurting yourself in some way Nearly every day   If you checked off any problems, how difficult have these problems made it for you to do your work, take care of things at home, or get along with other people? Extremely difficult   PHQ9 TOTAL SCORE (range: 0 - 27) 24 (Severe depression)

## 2024-04-25 ENCOUNTER — HOSPITAL ENCOUNTER (OUTPATIENT)
Facility: CLINIC | Age: 19
Setting detail: OBSERVATION
Discharge: HOSPICE | End: 2024-04-26
Attending: EMERGENCY MEDICINE | Admitting: EMERGENCY MEDICINE
Payer: COMMERCIAL

## 2024-04-25 ENCOUNTER — TELEPHONE (OUTPATIENT)
Dept: BEHAVIORAL HEALTH | Facility: CLINIC | Age: 19
End: 2024-04-25

## 2024-04-25 DIAGNOSIS — F33.2 SEVERE EPISODE OF RECURRENT MAJOR DEPRESSIVE DISORDER, WITHOUT PSYCHOTIC FEATURES (H): ICD-10-CM

## 2024-04-25 DIAGNOSIS — R45.851 SUICIDAL IDEATION: ICD-10-CM

## 2024-04-25 PROBLEM — F32.9 MAJOR DEPRESSIVE DISORDER WITH CURRENT ACTIVE EPISODE, UNSPECIFIED DEPRESSION EPISODE SEVERITY, UNSPECIFIED WHETHER RECURRENT: Status: ACTIVE | Noted: 2024-04-25

## 2024-04-25 LAB
ALBUMIN SERPL BCG-MCNC: 4.3 G/DL (ref 3.5–5.2)
ALP SERPL-CCNC: 50 U/L (ref 40–150)
ALT SERPL W P-5'-P-CCNC: 12 U/L (ref 0–50)
AMPHETAMINES UR QL SCN: NORMAL
ANION GAP SERPL CALCULATED.3IONS-SCNC: 10 MMOL/L (ref 7–15)
AST SERPL W P-5'-P-CCNC: 17 U/L (ref 0–35)
BARBITURATES UR QL SCN: NORMAL
BASOPHILS # BLD AUTO: 0 10E3/UL (ref 0–0.2)
BASOPHILS NFR BLD AUTO: 0 %
BENZODIAZ UR QL SCN: NORMAL
BILIRUB SERPL-MCNC: 0.6 MG/DL
BUN SERPL-MCNC: 7.7 MG/DL (ref 6–20)
BZE UR QL SCN: NORMAL
CALCIUM SERPL-MCNC: 9.4 MG/DL (ref 8.6–10)
CANNABINOIDS UR QL SCN: NORMAL
CHLORIDE SERPL-SCNC: 104 MMOL/L (ref 98–107)
CREAT SERPL-MCNC: 0.57 MG/DL (ref 0.51–0.95)
DEPRECATED HCO3 PLAS-SCNC: 26 MMOL/L (ref 22–29)
EGFRCR SERPLBLD CKD-EPI 2021: >90 ML/MIN/1.73M2
EOSINOPHIL # BLD AUTO: 0 10E3/UL (ref 0–0.7)
EOSINOPHIL NFR BLD AUTO: 0 %
ERYTHROCYTE [DISTWIDTH] IN BLOOD BY AUTOMATED COUNT: 12.3 % (ref 10–15)
FENTANYL UR QL: NORMAL
GLUCOSE SERPL-MCNC: 93 MG/DL (ref 70–99)
HCG UR QL: NEGATIVE
HCT VFR BLD AUTO: 38.2 % (ref 35–47)
HGB BLD-MCNC: 12.6 G/DL (ref 11.7–15.7)
IMM GRANULOCYTES # BLD: 0 10E3/UL
IMM GRANULOCYTES NFR BLD: 0 %
LYMPHOCYTES # BLD AUTO: 2.6 10E3/UL (ref 0.8–5.3)
LYMPHOCYTES NFR BLD AUTO: 40 %
MCH RBC QN AUTO: 30.1 PG (ref 26.5–33)
MCHC RBC AUTO-ENTMCNC: 33 G/DL (ref 31.5–36.5)
MCV RBC AUTO: 91 FL (ref 78–100)
MONOCYTES # BLD AUTO: 0.4 10E3/UL (ref 0–1.3)
MONOCYTES NFR BLD AUTO: 6 %
NEUTROPHILS # BLD AUTO: 3.5 10E3/UL (ref 1.6–8.3)
NEUTROPHILS NFR BLD AUTO: 54 %
NRBC # BLD AUTO: 0 10E3/UL
NRBC BLD AUTO-RTO: 0 /100
OPIATES UR QL SCN: NORMAL
PCP QUAL URINE (ROCHE): NORMAL
PLATELET # BLD AUTO: 337 10E3/UL (ref 150–450)
POTASSIUM SERPL-SCNC: 4.3 MMOL/L (ref 3.4–5.3)
PROT SERPL-MCNC: 6.9 G/DL (ref 6.4–8.3)
RBC # BLD AUTO: 4.18 10E6/UL (ref 3.8–5.2)
SODIUM SERPL-SCNC: 140 MMOL/L (ref 135–145)
WBC # BLD AUTO: 6.5 10E3/UL (ref 4–11)

## 2024-04-25 PROCEDURE — 81025 URINE PREGNANCY TEST: CPT | Performed by: NURSE PRACTITIONER

## 2024-04-25 PROCEDURE — 80307 DRUG TEST PRSMV CHEM ANLYZR: CPT | Performed by: NURSE PRACTITIONER

## 2024-04-25 PROCEDURE — 36415 COLL VENOUS BLD VENIPUNCTURE: CPT | Performed by: NURSE PRACTITIONER

## 2024-04-25 PROCEDURE — 99223 1ST HOSP IP/OBS HIGH 75: CPT | Mod: 95 | Performed by: NURSE PRACTITIONER

## 2024-04-25 PROCEDURE — 250N000013 HC RX MED GY IP 250 OP 250 PS 637: Performed by: NURSE PRACTITIONER

## 2024-04-25 PROCEDURE — 85004 AUTOMATED DIFF WBC COUNT: CPT | Performed by: NURSE PRACTITIONER

## 2024-04-25 PROCEDURE — 82247 BILIRUBIN TOTAL: CPT | Performed by: NURSE PRACTITIONER

## 2024-04-25 PROCEDURE — 99285 EMERGENCY DEPT VISIT HI MDM: CPT | Mod: 25

## 2024-04-25 PROCEDURE — G0378 HOSPITAL OBSERVATION PER HR: HCPCS

## 2024-04-25 RX ORDER — IBUPROFEN 600 MG/1
600 TABLET, FILM COATED ORAL EVERY 6 HOURS PRN
Status: DISCONTINUED | OUTPATIENT
Start: 2024-04-25 | End: 2024-04-26 | Stop reason: HOSPADM

## 2024-04-25 RX ORDER — HYDROXYZINE HYDROCHLORIDE 50 MG/1
50 TABLET, FILM COATED ORAL EVERY 6 HOURS PRN
Status: DISCONTINUED | OUTPATIENT
Start: 2024-04-25 | End: 2024-04-26 | Stop reason: HOSPADM

## 2024-04-25 RX ORDER — ONDANSETRON 4 MG/1
4 TABLET, ORALLY DISINTEGRATING ORAL EVERY 6 HOURS PRN
Status: DISCONTINUED | OUTPATIENT
Start: 2024-04-25 | End: 2024-04-26 | Stop reason: HOSPADM

## 2024-04-25 RX ORDER — TRAZODONE HYDROCHLORIDE 50 MG/1
50 TABLET, FILM COATED ORAL
Status: DISCONTINUED | OUTPATIENT
Start: 2024-04-25 | End: 2024-04-26 | Stop reason: HOSPADM

## 2024-04-25 RX ORDER — LANOLIN ALCOHOL/MO/W.PET/CERES
3 CREAM (GRAM) TOPICAL
Status: DISCONTINUED | OUTPATIENT
Start: 2024-04-25 | End: 2024-04-26 | Stop reason: HOSPADM

## 2024-04-25 RX ORDER — ESCITALOPRAM OXALATE 5 MG/1
5 TABLET ORAL DAILY
Status: DISCONTINUED | OUTPATIENT
Start: 2024-04-25 | End: 2024-04-26 | Stop reason: HOSPADM

## 2024-04-25 RX ORDER — ACETAMINOPHEN 325 MG/1
650 TABLET ORAL EVERY 4 HOURS PRN
Status: DISCONTINUED | OUTPATIENT
Start: 2024-04-25 | End: 2024-04-26 | Stop reason: HOSPADM

## 2024-04-25 RX ORDER — OLANZAPINE 5 MG/1
5-10 TABLET, ORALLY DISINTEGRATING ORAL 2 TIMES DAILY PRN
Status: DISCONTINUED | OUTPATIENT
Start: 2024-04-25 | End: 2024-04-26 | Stop reason: HOSPADM

## 2024-04-25 RX ORDER — OLANZAPINE 10 MG/2ML
10 INJECTION, POWDER, FOR SOLUTION INTRAMUSCULAR 2 TIMES DAILY PRN
Status: DISCONTINUED | OUTPATIENT
Start: 2024-04-25 | End: 2024-04-26 | Stop reason: HOSPADM

## 2024-04-25 RX ADMIN — ESCITALOPRAM OXALATE 5 MG: 5 TABLET, FILM COATED ORAL at 21:33

## 2024-04-25 ASSESSMENT — ACTIVITIES OF DAILY LIVING (ADL)
ADLS_ACUITY_SCORE: 35

## 2024-04-25 NOTE — ED TRIAGE NOTES
Pt presents with Mother with complaint of suicidal ideation without plan. Pt stopped Effexor abruptly 2 weeks and was evaluated for similar symptoms at [facility]. Pt was discharged from ED. Pt contracts for safety and is asking for Empath unit.

## 2024-04-25 NOTE — ED PROVIDER NOTES
"  History     Chief Complaint:  Suicidal       HPI   Mariana Blake is a 18 year old female who presents with suicidal ideation over the past 2 weeks. She was seen in the ED at [facility] on 4/12, where she had been experiencing similar symptoms. After her discharge, she stopped taking her Effexor altogether, and has not been on any medications since. Regarding her suicidal thoughts, she does endorse various plans, but no intent to act on them. She lives at home with her parents, who do own guns that are locked away. She has been dealing with anxiety and depression for the past 4 years, and is currently seeing a therapist. No previous suicide attempts, recent acts of self-harm, homicidal ideation, hallucinations, cough, fever, or sore throat. No alcohol, tobacco, or illicit drug/marijuana use.     Independent Historian:   None - Patient Only    Review of External Notes:   I reviewed medical records from: ED visit at [facility] on 4/12/24 for similar complaints    Medications:    Effexor (not taking)    Past Medical History:    Anxiety  Depression  Suicidal ideation     Physical Exam   Patient Vitals for the past 24 hrs:   BP Temp Temp src Pulse Resp SpO2 Height Weight   04/25/24 1822 112/74 98  F (36.7  C) Oral 54 18 -- 1.575 m (5' 2\") 45 kg (99 lb 4.8 oz)   04/25/24 1702 110/74 97.3  F (36.3  C) Temporal 69 18 100 % -- --      Physical Exam  Constitutional: Well developed, nontox appearance  Head: Atraumatic.   Neck:  no stridor  Eyes: no scleral icterus  Cardiovascular: RRR, 2+ bilat radial pulses  Pulmonary/Chest: nml resp effort  Ext: Warm, well perfused, no edema  Neurological: A&O, symmetric facies, moves ext x4  Skin: Skin is warm and dry.   Psychiatric: Endorses SI, denies HI, does not appear to be responding to internal stimuli, tearful, somewhat flattened affect  Nursing note and vitals reviewed.    Emergency Department Course     Assessments/Consultations/Discussion of Management or Tests:  ED Course " as of 24 1847   Thu 2024   171 I evaluated the patient.      Social Determinants of Health affecting care:   Social determinants affecting patient's health include: none    Disposition:  The patient was transferred to [facility].     Impression & Plan    CMS Diagnoses: None    MIPS (If applicable):  N/A    Medical Decision Makin year old female presenting w/ suicidal ideation    DDx includes psychiatric disorder NOS, personality disorder NOS, MDD, KHALIF, adjustment disorder.  Doubt acute psychosis given history and physical exam. Given the patient's history and symptomatology, I think it would be appropriate to have them evaluated in the empath unit.  Pt is medically clear given normal vital signs and no other complaints.   Pt counseled on the disposition and diagnosis.  They are understanding and agreeable to plan. Patient transferred to the empath unit in stable condition.       Diagnosis:    ICD-10-CM    1. Suicidal ideation  R45.851       2. Major depressive disorder with current active episode, unspecified depression episode severity, unspecified whether recurrent  F32.9          Scribe Disclosure:  I, HALLIE POSADA, am serving as a scribe at 5:05 PM on 2024 to document services personally performed by Don Christiansen MD based on my observations and the provider's statements to me.          Don Christiansen MD  24

## 2024-04-25 NOTE — TELEPHONE ENCOUNTER
Would advise [facility] today for evaluation at [facility]. Patient could also go to [facility] ED for intake and evaluation.  Elizabeth Dean PA-C

## 2024-04-25 NOTE — ED NOTES
[facility]  ED to EMPATH Checklist:      Goal for EMPATH: Suicidality    Current Behavior: Flat Affect and Sad    Safety Concerns: Suicidal, no plan    Legal Hold Status: Voluntary    Medically Cleared by ED provider: Yes    Patient Therapeutically Searched: Not searched - Currently in triage    Belongings: Remain with patient    Independent Ambulation at Baseline: Yes/No: Yes    Participates in Care/Conversation: Yes/No: Yes    Patient Informed about EMPATH: Yes/No: Yes    DEC: Ordered and pending    Patient Ready to be Transferred to EMPATH? Yes/No: Yes

## 2024-04-25 NOTE — TELEPHONE ENCOUNTER
Spoke with patient. Relayed provider's recommendations. Patient denies a plan for self harm and agrees to go to [facility] at [facility]. Patient is waiting for mother to return from work as she does not want to drive herself.     Contracts for safety.    Belen Schafer RN

## 2024-04-25 NOTE — PROGRESS NOTES
18 year old female received from Triage for depression with suicidal ideation. Pt reports she has been feeling very overwhelmed for the past two weeks. She reports she has persistent suicidal thoughts and has felt urges to hurt herself. Pt states two weeks ago she had a therapist visit online and they recommended she go to the ER. Pt states she went to the ER, they discharged her to her parents and has been trying to get into a PHP program but has not heard back from them. Pt states her mother was able to take a week off of work and stay with her but mother returned to work this week. Pt states she has felt depressed for a long time but the feelings have increased in the past month. Pt states she has suicidal thoughts and states that with every thought she thinks of a plan. Pt states she has thoughts to overdose on medications found in the house and states she has conducted research online. Pt denies any intent and states she feels safe in the hospital. Pt states she used to self harm by cutting on upper thigh with a razor blade but has not engaged in SIB in two or three months. Pt reports she has been sleeping in her parents room as a way to keep safe. Pt denies any stressors. She denies any previous SA. She denies any HI/Donahue. Pt denies the use of tobacco, alcohol or drugs. Pt reports she is not on any medications at home. She reports she has had various medication trials but does not feel any have worked for her. She reports she stopped taking Effexor two weeks ago after she felt overwhelmed and stayed in bed for a couple of days and did not get her medication from her car.     Nursing and risk assessments completed.  Assessments reviewed with LMHP and physician. Video monitoring in progress, patient informed.  Admission information reviewed with patient. Patient given a tour of [facility] and instructions on using the facility. Questions regarding [facility] addressed. Pt search completed and belongings inventoried.

## 2024-04-26 ENCOUNTER — HOSPITAL ENCOUNTER (INPATIENT)
Facility: CLINIC | Age: 19
LOS: 5 days | Discharge: HOME | DRG: 885 | End: 2024-05-01
Attending: PSYCHIATRY & NEUROLOGY | Admitting: PSYCHIATRY & NEUROLOGY
Payer: COMMERCIAL

## 2024-04-26 ENCOUNTER — TELEPHONE (OUTPATIENT)
Dept: BEHAVIORAL HEALTH | Facility: CLINIC | Age: 19
End: 2024-04-26
Payer: COMMERCIAL

## 2024-04-26 VITALS
HEART RATE: 72 BPM | RESPIRATION RATE: 18 BRPM | SYSTOLIC BLOOD PRESSURE: 114 MMHG | TEMPERATURE: 97.4 F | OXYGEN SATURATION: 100 % | BODY MASS INDEX: 18.27 KG/M2 | DIASTOLIC BLOOD PRESSURE: 71 MMHG | HEIGHT: 62 IN | WEIGHT: 99.3 LBS

## 2024-04-26 DIAGNOSIS — F32.9 MAJOR DEPRESSIVE DISORDER WITH CURRENT ACTIVE EPISODE, UNSPECIFIED DEPRESSION EPISODE SEVERITY, UNSPECIFIED WHETHER RECURRENT: Primary | ICD-10-CM

## 2024-04-26 DIAGNOSIS — K59.04 CHRONIC IDIOPATHIC CONSTIPATION: ICD-10-CM

## 2024-04-26 DIAGNOSIS — F41.1 GENERALIZED ANXIETY DISORDER: ICD-10-CM

## 2024-04-26 LAB
TSH SERPL DL<=0.005 MIU/L-ACNC: 0.87 UIU/ML (ref 0.5–4.3)
VIT B12 SERPL-MCNC: 485 PG/ML (ref 232–1245)
VIT D+METAB SERPL-MCNC: 24 NG/ML (ref 20–50)

## 2024-04-26 PROCEDURE — 250N000013 HC RX MED GY IP 250 OP 250 PS 637: Performed by: PSYCHIATRY & NEUROLOGY

## 2024-04-26 PROCEDURE — 84443 ASSAY THYROID STIM HORMONE: CPT | Performed by: NURSE PRACTITIONER

## 2024-04-26 PROCEDURE — 82607 VITAMIN B-12: CPT | Performed by: NURSE PRACTITIONER

## 2024-04-26 PROCEDURE — 250N000011 HC RX IP 250 OP 636: Performed by: NURSE PRACTITIONER

## 2024-04-26 PROCEDURE — 250N000013 HC RX MED GY IP 250 OP 250 PS 637: Performed by: NURSE PRACTITIONER

## 2024-04-26 PROCEDURE — G0378 HOSPITAL OBSERVATION PER HR: HCPCS

## 2024-04-26 PROCEDURE — 36415 COLL VENOUS BLD VENIPUNCTURE: CPT | Performed by: NURSE PRACTITIONER

## 2024-04-26 PROCEDURE — 99223 1ST HOSP IP/OBS HIGH 75: CPT | Mod: AI | Performed by: NURSE PRACTITIONER

## 2024-04-26 PROCEDURE — 128N000002 HC R&B CD/MH ADOLESCENT

## 2024-04-26 PROCEDURE — 82306 VITAMIN D 25 HYDROXY: CPT | Performed by: NURSE PRACTITIONER

## 2024-04-26 RX ORDER — OLANZAPINE 10 MG/1
10 TABLET ORAL 3 TIMES DAILY PRN
Status: DISCONTINUED | OUTPATIENT
Start: 2024-04-26 | End: 2024-05-01 | Stop reason: HOSPADM

## 2024-04-26 RX ORDER — OLANZAPINE 10 MG/2ML
10 INJECTION, POWDER, FOR SOLUTION INTRAMUSCULAR 3 TIMES DAILY PRN
Status: DISCONTINUED | OUTPATIENT
Start: 2024-04-26 | End: 2024-05-01 | Stop reason: HOSPADM

## 2024-04-26 RX ORDER — MAGNESIUM HYDROXIDE/ALUMINUM HYDROXICE/SIMETHICONE 120; 1200; 1200 MG/30ML; MG/30ML; MG/30ML
30 SUSPENSION ORAL EVERY 4 HOURS PRN
Status: DISCONTINUED | OUTPATIENT
Start: 2024-04-26 | End: 2024-05-01 | Stop reason: HOSPADM

## 2024-04-26 RX ORDER — ONDANSETRON 4 MG/1
4 TABLET, ORALLY DISINTEGRATING ORAL EVERY 6 HOURS PRN
Status: DISCONTINUED | OUTPATIENT
Start: 2024-04-26 | End: 2024-05-01 | Stop reason: HOSPADM

## 2024-04-26 RX ORDER — ACETAMINOPHEN 325 MG/1
650 TABLET ORAL EVERY 4 HOURS PRN
Status: DISCONTINUED | OUTPATIENT
Start: 2024-04-26 | End: 2024-05-01 | Stop reason: HOSPADM

## 2024-04-26 RX ORDER — AMOXICILLIN 250 MG
1 CAPSULE ORAL 2 TIMES DAILY PRN
Status: DISCONTINUED | OUTPATIENT
Start: 2024-04-26 | End: 2024-05-01 | Stop reason: HOSPADM

## 2024-04-26 RX ORDER — DESVENLAFAXINE 25 MG/1
25 TABLET, EXTENDED RELEASE ORAL DAILY
Status: DISCONTINUED | OUTPATIENT
Start: 2024-04-26 | End: 2024-04-29

## 2024-04-26 RX ORDER — LOPERAMIDE HCL 2 MG
2 CAPSULE ORAL 4 TIMES DAILY PRN
Status: DISCONTINUED | OUTPATIENT
Start: 2024-04-26 | End: 2024-05-01 | Stop reason: HOSPADM

## 2024-04-26 RX ORDER — HYDROXYZINE HYDROCHLORIDE 25 MG/1
25 TABLET, FILM COATED ORAL EVERY 4 HOURS PRN
Status: DISCONTINUED | OUTPATIENT
Start: 2024-04-26 | End: 2024-05-01 | Stop reason: HOSPADM

## 2024-04-26 RX ORDER — QUETIAPINE FUMARATE 50 MG/1
25 TABLET, FILM COATED ORAL DAILY
Status: ON HOLD | COMMUNITY
End: 2024-05-01

## 2024-04-26 RX ORDER — TRAZODONE HYDROCHLORIDE 50 MG/1
50 TABLET, FILM COATED ORAL
Status: DISCONTINUED | OUTPATIENT
Start: 2024-04-26 | End: 2024-05-01 | Stop reason: HOSPADM

## 2024-04-26 RX ADMIN — DESVENLAFAXINE 25 MG: 25 TABLET, EXTENDED RELEASE ORAL at 16:06

## 2024-04-26 RX ADMIN — ACETAMINOPHEN 650 MG: 325 TABLET, FILM COATED ORAL at 02:46

## 2024-04-26 RX ADMIN — HYDROXYZINE HYDROCHLORIDE 25 MG: 25 TABLET, FILM COATED ORAL at 11:53

## 2024-04-26 RX ADMIN — ONDANSETRON 4 MG: 4 TABLET, ORALLY DISINTEGRATING ORAL at 02:46

## 2024-04-26 ASSESSMENT — ACTIVITIES OF DAILY LIVING (ADL)
ADLS_ACUITY_SCORE: 28
ADLS_ACUITY_SCORE: 35
ADLS_ACUITY_SCORE: 28
ADLS_ACUITY_SCORE: 35
ADLS_ACUITY_SCORE: 28
ADLS_ACUITY_SCORE: 35
ADLS_ACUITY_SCORE: 35
ADLS_ACUITY_SCORE: 28
ADLS_ACUITY_SCORE: 28
ADLS_ACUITY_SCORE: 35
ADLS_ACUITY_SCORE: 28

## 2024-04-26 NOTE — PROGRESS NOTES
04/26/24 0637   Patient Belongings   Did you bring any home meds/supplements to the hospital?  No   Patient Belongings locker   Patient Belongings Put in Hospital Secure Location (Security or Locker, etc.) clothing;earrings;shoes;other (see comments)   Belongings Search Yes   Clothing Search Yes   Second Staff Jovan     Sent to Locker 4/26:  - Blue Bag  - Deoderant  - 3 Sweatshirts  - 2 Leggings  - 1 Sweatpants  - 5 Underwear  - 2 Bras    Brought in 4/28/24  -Sunglasses  -7 Underwear   -Flushable wipes  -2 books   -Bag    Patient items in the locker: 1 Black [place] Sweatshirt. 1 gray sweat pants. 1 Yessica red bra. 1 light purple underwear. 1 baby blue and pink sock. 1 dark blue and pink sock. 1 tan hair tie. 2 gold and pink earrings. 1 pair of tan boots.  A               Admission:  I am responsible for any personal items that are not sent to the safe or pharmacy.  Will is not responsible for loss, theft or damage of any property in my possession.    Signature:  _________________________________ Date: _______  Time: _____                                              Staff Signature:  ____________________________ Date: ________  Time: _____      2nd Staff person, if patient is unable/unwilling to sign:    Signature: ________________________________ Date: ________  Time: _____     Discharge:  Will has returned all of my personal belongings:    Signature: _________________________________ Date: ________  Time: _____                                          Staff Signature:  ____________________________ Date: ________  Time: _____

## 2024-04-26 NOTE — TELEPHONE ENCOUNTER
S: ANGELIQUE Edgar  Sandra  calling at 10:27 PM  about a 18 year old/Female presenting with SI w/ a plan.     B: Pt arrived via Family. Presenting problem, stressors: Came to  ER from her MOM's with SI and worsening depression. Recently had increase in Effexor meds and had increase in SI to overdose on her medications or her dad's insulin. Stopped taking because of this.    Pt affect in ED: Flat  Pt Dx: Major Depressive Disorder, Generalized Anxiety Disorder, and Sx of eating DO  Previous [facility] hx? No  Pt endorses SI with a plan to overdose on medications    Hx of suicide attempt? No  Pt endorses SIB via cutting, most recent episode didn't;t identify  Pt denies HI   Pt denies hallucinations .   Pt RARS Score: 3    Hx of aggression/violence, sexual offenses, legal concerns, Epic care plan? describe: No to all  Current concerns for aggression this visit? No  Does pt have a history of Civil Commitment? No  Is Pt their own guardian? Yes    Pt is prescribed medication. Is patient medication compliant? Yes aside from stopping taking recently  Pt endorses OP services: Medication Management and Therapist  CD concerns: None  Acute or chronic medical concerns: No  Does Pt present with specific needs, assistive devices, or exclusionary criteria? None      Pt is ambulatory  Pt is able to perform ADLs independently      A: Pt to be reviewed for [facility] admission. Pt is Voluntary  Preferred placement:  Prefer [facility] as backup    COVID Symptoms: No  If yes, COVID test required   Utox: Negative   CMP: In process  CBC: WNL  HCG: Negative    R: Patient cleared and ready for behavioral bed placement: Yes  Pt placed on [facility] worklist? Yes    Does Patient need a Transfer Center request created? Yes, writer completed Transfer Center request at:  10:37 pm

## 2024-04-26 NOTE — PLAN OF CARE
Problem: Adult Behavioral Health Plan of Care  Goal: Plan of Care Review  Outcome: Progressing   Goal Outcome Evaluation:                Patient admitted to [facility] from [facility] at 0630 for Increased depression and Suicide Ideation. Patient presented with calm flat affect on admission. Endorse SI without a plan and contracted for safety on the unit. Denied any SIB,HI, AVH. Patient also endorsed anxiety and depression of 9/10. Patient denied pain or any physical concerns at this time.    Patient was compliant with initial search and admission process. Patient was also oriented to the unit and room. Provider was paged awaiting orders.    Patient is currently settled in her room. No behavior or safety concerns at this time. Will continue to monitor and provide support as needed.

## 2024-04-26 NOTE — H&P
"[facility]   Psychiatric History and Physical  Admission date: 4/26/2024      Chief Complaint:   \"Depression\".      HPI:   From ED: Mariana Blake is a 18 year old female who presents with suicidal ideation over the past 2 weeks. She was seen in the ED at [facility] on 4/12, where she had been experiencing similar symptoms. After her discharge, she stopped taking her Effexor altogether, and has not been on any medications since. Regarding her suicidal thoughts, she does endorse various plans, but no intent to act on them. She lives at home with her parents, who do own guns that are locked away. She has been dealing with anxiety and depression for the past 4 years, and is currently seeing a therapist. No previous suicide attempts, recent acts of self-harm, homicidal ideation, hallucinations, cough, fever, or sore throat. No alcohol, tobacco, or illicit drug/marijuana use.   Mariana Blake is a 18 year old female with history of depression, anxiety, and suicidal ideation.  The patient is a good historian.  States the reason for admission is severe depression.  She has been having suicidal thoughts.  States that she was seen in the emergency room 2 weeks ago and was released with recommendations to attend the [facility] program.  Unfortunately, they were not able to set it up and the patient continued to get more depressed.  She also stopped taking her Effexor.  She has been on Effexor for about 2 to 3 months now, 75 mg every day.  She was taking it as prescribed prior to stopping it cold turkey about 2 weeks ago.  States the medication was never helpful.  In the last month, the patient has been severely depressed.  That she is sleeping \"a lot\", some days sleeping all day and all night.  Energy is very low.  Denies problems with memory and concentration.  That she has not been eating.  She lost weight.  Continues to have suicidal thoughts but feels safe on the unit.  The patient " "feels hopeless, helpless, and worthless.  Anxiety is a daily occurrence.  She has been prescribed Seroquel in the past but the medication have not been helpful, \"just makes me tired\".  The patient reports panic attacks with the last occurrence about a month ago during which she is experiencing shortness of breath.  The panic attacks appear mainly at night.  Denies history of juno or psychosis including auditory visual hallucinations, paranoia, delusions.  Denies PTSD and OCD.  Denies eating disorders however, states that she started restricting about 2 weeks ago.  Denies borderline personality disorder.  She has never attempted suicide.  She has a history of cutting on her thighs.  She cuts herself \"instead of attempting suicide\".Denies seizures, head injuries, and loss of consciousness.  Spoke with patients mother Briana Blake, [phone].  Discussed the plan of care.  Requested to bring the genetic testing the patient had about a year ago.       Past Psychiatric History:   Patient has a history of depression and anxiety.  This is her first hospitalization for mental illness.  She had tried multiple medications without and affect.  Remembers taking Zoloft, Prozac, Paxil, Lexapro (5 mg only), Celexa, Wellbutrin, Cymbalta, Seroquel, melatonin, hydroxyzine, Effexor.  She has never been on Pristiq or Viibryd.  Denies history of suicide attempts.  Reports history of cutting on her thighs with last incident about 2 or 3 months ago.  Currently has a psychiatrist and a therapist.  She does not like her current psychiatrist and would like to change it with somebody else.  She sees her therapist twice a month.      Substance Use and History:   Denies history of abusing drugs and alcohol.  Reports vaping on a daily basis.  Does not want a nicotine patch or gum.      Past Medical History:   PAST MEDICAL HISTORY:   Past Medical History:   Diagnosis Date    NO ACTIVE PROBLEMS      PAST SURGICAL HISTORY:   Past Surgical " History:   Procedure Laterality Date    NO HISTORY OF SURGERY           Family History:   FAMILY HISTORY:   Family History   Problem Relation Age of Onset    Diabetes Father         type one   Positive for mom with depression, dad with anxiety and currently taking Zoloft.  Denies history of drugs and alcohol.      Social History:   The patient grew up in [place].  Her parents are .  The patient is the second of 3 children.  Currently lives at home with her parents and younger brother.  The patient herself has never been  and has no children.  She completed some college.  She has not been able to work for the last 2 weeks.  Does not know if she still has a job.  Denies  and legal history.       Physical ROS:   The patient denies physical issues at the moment.  The remainder of 10-point review of systems was negative except as noted in HPI.       PTA Medications:     Medications Prior to Admission   Medication Sig Dispense Refill Last Dose    ACETAMINOPHEN        CHILDRENS MOTRIN OR None Entered (Patient not taking: Reported on 4/6/2023)       IBUPROFEN        venlafaxine (EFFEXOR XR) 37.5 MG 24 hr capsule Take 75 mg by mouth (Patient not taking: Reported on 4/25/2024)             Allergies:   No Known Allergies       Labs:     Recent Results (from the past 48 hour(s))   HCG qualitative urine    Collection Time: 04/25/24  9:38 PM   Result Value Ref Range    hCG Urine Qualitative Negative Negative   Urine Drug Screen Panel    Collection Time: 04/25/24  9:38 PM   Result Value Ref Range    Amphetamines Urine Screen Negative Screen Negative    Barbituates Urine Screen Negative Screen Negative    Benzodiazepine Urine Screen Negative Screen Negative    Cannabinoids Urine Screen Negative Screen Negative    Cocaine Urine Screen Negative Screen Negative    Fentanyl Qual Urine Screen Negative Screen Negative    Opiates Urine Screen Negative Screen Negative    PCP Urine Screen Negative Screen Negative  "  Comprehensive metabolic panel    Collection Time: 04/25/24 10:18 PM   Result Value Ref Range    Sodium 140 135 - 145 mmol/L    Potassium 4.3 3.4 - 5.3 mmol/L    Carbon Dioxide (CO2) 26 22 - 29 mmol/L    Anion Gap 10 7 - 15 mmol/L    Urea Nitrogen 7.7 6.0 - 20.0 mg/dL    Creatinine 0.57 0.51 - 0.95 mg/dL    GFR Estimate >90 >60 mL/min/1.73m2    Calcium 9.4 8.6 - 10.0 mg/dL    Chloride 104 98 - 107 mmol/L    Glucose 93 70 - 99 mg/dL    Alkaline Phosphatase 50 40 - 150 U/L    AST 17 0 - 35 U/L    ALT 12 0 - 50 U/L    Protein Total 6.9 6.4 - 8.3 g/dL    Albumin 4.3 3.5 - 5.2 g/dL    Bilirubin Total 0.6 <=1.2 mg/dL   CBC with platelets and differential    Collection Time: 04/25/24 10:18 PM   Result Value Ref Range    WBC Count 6.5 4.0 - 11.0 10e3/uL    RBC Count 4.18 3.80 - 5.20 10e6/uL    Hemoglobin 12.6 11.7 - 15.7 g/dL    Hematocrit 38.2 35.0 - 47.0 %    MCV 91 78 - 100 fL    MCH 30.1 26.5 - 33.0 pg    MCHC 33.0 31.5 - 36.5 g/dL    RDW 12.3 10.0 - 15.0 %    Platelet Count 337 150 - 450 10e3/uL    % Neutrophils 54 %    % Lymphocytes 40 %    % Monocytes 6 %    % Eosinophils 0 %    % Basophils 0 %    % Immature Granulocytes 0 %    NRBCs per 100 WBC 0 <1 /100    Absolute Neutrophils 3.5 1.6 - 8.3 10e3/uL    Absolute Lymphocytes 2.6 0.8 - 5.3 10e3/uL    Absolute Monocytes 0.4 0.0 - 1.3 10e3/uL    Absolute Eosinophils 0.0 0.0 - 0.7 10e3/uL    Absolute Basophils 0.0 0.0 - 0.2 10e3/uL    Absolute Immature Granulocytes 0.0 <=0.4 10e3/uL    Absolute NRBCs 0.0 10e3/uL          Physical and Psychiatric Examination:   /78 (Patient Position: Sitting, Cuff Size: Adult Regular)   Pulse 61   Temp 98.5  F (36.9  C) (Oral)   Resp 16   Ht 1.575 m (5' 2\")   Wt 43.5 kg (96 lb)   LMP 03/01/2024 (Approximate)   SpO2 99%   BMI 17.56 kg/m    Weight is 96 lbs 0 oz  Body mass index is 17.56 kg/m .  Physical Exam:  I have reviewed the physical exam as documented by the medical team and agree with findings and assessment and have " no additional findings to add at this time.  Mental Status Exam:  Appearance: awake, alert and well groomed  Attitude:  cooperative  Eye Contact:  fair  Mood:  anxious and depressed  Affect:  appropriate and in normal range  Speech:  clear, coherent  Language: fluent and intact in English  Psychomotor, Gait, Musculoskeletal:  no evidence of tardive dyskinesia, dystonia, or tics  Thought Process:  logical and goal oriented  Associations:  no loose associations  Thought Content:  passive suicidal ideation present  Insight:  fair  Judgement:  fair  Oriented to:  time, person, and place  Attention Span and Concentration:  intact  Recent and Remote Memory:  intact  Fund of Knowledge:  low-normal         Admission Diagnoses:   Major Depressive Disorder, recurrent, severe, without psychosis  Suicidal ideation  Generalized anxiety disorder       Assessment & Plan:   Medications:  Start Pristiq, 25 mg every morning.  Will increase to 50 mg on Monday.  Additional medications will include hydroxyzine, Zyprexa, trazodone  Lab work:  Lab work was reviewed and is mostly normal.  U tox was negative.  CBC and CMP were WNL.  --Will order vitamin D, B12, folate, TSH with T4  Consults:   Internal medicine to follow up for medical problems.   Care was coordinated with the treatment team.  The patient was consulted on nature of illness and treatment options.   Disposition Plan   Reason for ongoing admission: poses an imminent risk to self  Discharge location: home with family  Discharge Medications: not ordered  Follow-up Appointments: not scheduled  Legal Status: voluntary  Estimated length of stay: 5-7 days  Entered by: ROSALIE Roberts CNP on 4/26/2024 at 8:03 AM     This note was created with the help of Dragon dictation system. All grammatical/typing errors or context distortion are unintentional and inherent to software.

## 2024-04-26 NOTE — PROGRESS NOTES
[facility] psychiatric provider Alie Rice called writer requesting writer put pt on IP  waitlist, prior LMHP left for the night. Writer completed this. Pt is going voluntarily.

## 2024-04-26 NOTE — PROGRESS NOTES
04/26/24 1407   Individualization/Patient Specific Goals   Patient Personal Strengths interests/hobbies;motivated for treatment;positive educational history;stable living environment   Patient Vulnerabilities history of unsuccessful treatment;limited support system   Interprofessional Rounds   Summary New admit- pt admitted due to increasing SI and depression. Pt has stopped eating much in the last two weeks, she also stopped taking meds. Pt has completed [employer] testing in the past. Pt calm, cooperative and this is her first admission.   Participants advanced practice nurse;nursing;OT;CTC   Behavioral Team Discussion   Participants Collin Parsons RN, Tina OT and [place] CTC.   Progress NA new admit   Anticipated length of stay 5-7 days   Continued Stay Criteria/Rationale Stabilize mental health symptoms of SI and depression as well as receive medication management.   Medical/Physical See H&P   Precautions See below   Plan Stabilize mental health symptoms and establish a safe discharge plan.   Safety Plan Safe secure milieu   Anticipated Discharge Disposition home with family     PRECAUTIONS AND SAFETY    Behavioral Orders   Procedures    Code 1 - Restrict to Unit    Routine Programming     As clinically indicated    Status 15     Every 15 minutes.    Suicide precautions: Suicide Risk: LOW     Patients on Suicide Precautions should have a Combination Diet ordered that includes a Diet selection(s) AND a Behavioral Tray selection for Safe Tray - with utensils, or Safe Tray - NO utensils       Order Specific Question:   Suicide Risk     Answer:   LOW

## 2024-04-26 NOTE — TELEPHONE ENCOUNTER
R: [place]  Access Inpatient Bed Call Log 4/25/2024  10:30 PM   Intake has called facilities that have not updated their bed status within the last 12 hours.??         *[facility] -- [facility]: @ cap per website.   [place] -- [facility]:  @ cap per website. [phone] ECT Tx offered.   [place] -- [facility]: Posting 0 beds. ECT Tx offered.        [place] -- [facility]:  @ cap per website.   [place] -- [facility]: Posting 0 beds.   [facility] -- [facility]: Posting 0 beds. [phone] ECT Tx offered.   [place] [facility] -- [facility]/YA beds Posting 1 bed. Pt must be 16-28  [place] -- [facility] Posting 0 beds. [phone]     [place] -- [facility]: @ cap per website.   [place] -- [facility]:  Posting 2 beds. [phone].

## 2024-04-26 NOTE — ED NOTES
Pt tearful when approached. She is agreeable with plan to go IP voluntary. Pt med compliant with scheduled medications. Pt cooperative with cares. Pt did not have any dinner this shift. Pt given sample cup for UA.

## 2024-04-26 NOTE — PLAN OF CARE
Mariana Blake  April 25, 2024  Plan of Care Hand-off Note     Patient Care Path: observation    Plan for Care:   Patient is agreeable to stay at [facility] to stabilize. She remains suicidal with plan to overdose but no intent to act on this. She has no prior attempts. Patient stopped taking her prescribed Effexor after her presentation to [facility] on 4/12/24 and is not taking any medication currently, having tried many in the past.  Patient is labile and teary in assessment, closed off but able to answer all questions posed. Patient to remain on OBS at [facility] and check in tomorrow.    Identified Goals and Safety Issues: Stabilize patient from current depressive state. Examine and potentially reintroduce appropriate medications. Set patient up with follow up medication manangement and a therapist at d/c.    Overview:  Parents Urbano and Briana [phone], [phone]            Legal Status: Legal Status at Admission: Voluntary/Patient has signed consent for treatment    Psychiatry Consult: Yes       Updated MD and RN  regarding plan of care.           Serge Pastrana MA

## 2024-04-26 NOTE — TELEPHONE ENCOUNTER
R: [facility] Access Inpatient Bed Call Log 4/26/24 12:20 AM    Intake has called facilities that have not updated the bed status within the last 12 hours.                   Adults:         [facility] is posting 0 beds.         [facility] is posting 0 beds. [phone] Per call to Skylar at 4:30 PM, at capacity.   [facility] posted 0 beds. [facility] [phone] Negative covid required.     [facility] is posting 0 beds. Neg covid. No high school/Margie-psych. [phone]; Per call to Lora, currently at capacity.   [facility] posted 0 beds.  [phone] [facility] posted 0 beds. [phone]    [facility] posted 0 beds.  [facility] [phone]   [facility] is posting 1 YA beds. Ages 18-28. Negative covid. [phone]   [facility] is posting 2 beds. [phone]. Per Valentina, at capacity 10:40 PM     Pt remains on the work list pending appropriate bed availability.       R: Patient cleared and ready for behavioral bed placement: Yes    2:06 AM Intake called [facility]. Per [place], they are not taking referrals tonight.     2:14 AM Intake called [facility]A and provided MRN for charge nurse, Luz Maria, to review.     2:50 AM Intake received a call from charge nurseLuz Maria    3:31 AM Intake called [facility] and spoke with Naida lo.     3:36 AM Intake received a call from Dr. Ramo Reynolds, accepting this pt to [facility])    3:53 AM Intake called [facility] and provided disposition to Luz Maria TRINH. Nurse report: Charge will call ED.     3:55 AM Intake called [facility]. Pt's nurse to call intake back for placement information.     4:03 AM Intake received a call from nurse at [facility]. Intake provided placement information.

## 2024-04-26 NOTE — PLAN OF CARE
INITIAL PSYCHOSOCIAL ASSESSMENT AND NOTE    Information for assessment was obtained from:       [x]Patient     []Parent     []Community provider    [x]Hospital records   []Other     []Guardian       Presenting Problem:  Patient is a 18 year old female who uses she/her. Patient was admitted to [facility] on 4/26/2024 [facility] voluntarily.    Presenting issues and presentation for admit:  Per ED assessment: Patient presents to [facility] ED and [facility] for concerns of increased SI risk. Patient has increased SI over the past two weeks endorsing various plans including overdose on medications. Pt endorses struggles with body image her entire life and restricts eating as a result.  She has been prescribed numerous medications over the years and most recently has been on Effexor 37.5 mg but is not taking this currently.       The following areas have been assessed:    History of Mental Health and Chemical Dependency:  Mental Health History:  Patient has a historical diagnosis of MDD, KHALIF, and SI .   The patient denies a history of suicide attempts.   Patient  has a history of engaged in non-suicidal self-injury via cutting her thighs.     Previous psychiatric hospitalizations and treatments (including outpatient, residential, and inpatient care:  No prior admissions, pt was in [facility] Ed on 4/12/24  for SI.   Patient has a care team with no current PCP. She is getting medication management through [facility] and [facility]-BC[facility]. She has a therapist through [facility] as well in [facility] location and meets with twice weekly and has been meeting with her for over a year.     Substance Use History  None reported      Patient's current relationship status is   single.   Patient reported having zero child(dipika).       Family Description (Constellation, significant information and  events, Family Psychiatric History):   Patient grew up in  [place], [place],  raised by biological parents.  Parents stayed .  She has one sister, 21 and one brother, 15.  Family History: Anxiety Disorder, Depression, Suicide Attempt, Diabetes   Mom - depression   Dad- anxiety  Aunt and Uncles - anxiety and depression       Significant Medical issues, Life events or Trauma history:   None reported      Living Situation:  Patient's current living/housing situation is staying with parents . They live with parents and they report that housing is stable and they are able to return upon discharge.       Educational Background:    Patient's highest education level was some college. Patient reports they are  able to understand written materials.     Occupational and Financial Status:     Patient is currently employed part time and reports they are not able to function appropriately at work..  Patient reports  income is obtained through employment and parents.  Patient does identify finances as a current stressor. They are insured under [employer]. 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  Contextual influences on patient's health include communication.   Patient identified their preferred language to be English. Patient reported they do not need the assistance of an .  Spiritual considerations include: none    Social Functioning (organizations, interests, support system):   In their free time, patient reports they like reading.      Patient identified parents, siblings, and therapist as part of their support system.  Patient identified the  quality of these relationships as good.       Current Treatment Providers are:    Medication Management/Psychiatry:  Name/Clinic: Kaibeh Benson, PMHNP-BC, BRADY, APRN, [facility]  Number: [phone]    Therapist:   Name/Clinic: Laurel Medeiros MA, LMFT  Number: [phone]    Other contact information (family, friends, SO) and BREANA status: BREANA signed          Patient will have psychiatric assessment and medication management by the psychiatrist. Medications will be reviewed and adjusted per DO/MD/APRN CNP as indicated. The treatment team will continue to assess and stabilize the patient's mental health symptoms with the use of medications and therapeutic programming. Hospital staff will provide a safe environment and a therapeutic milieu. Staff will continue to assess patient as needed. Patient will participate in unit groups and activities. Patient will receive individual and group support on the unit.      CTC will do individual inpatient treatment planning and after care planning. CTC will discuss options for increasing community supports with the patient. CTC will coordinate with outpatient providers and will place referrals to ensure appropriate follow up care is in place.

## 2024-04-26 NOTE — PLAN OF CARE
Problem: Psychotic Symptoms  Goal: Psychotic Symptoms  Description: Signs and symptoms of listed problems will be absent or manageable.  Outcome: Progressing   Goal Outcome Evaluation:  Pt isolated to self in her room most of the shift. She presented with a depressed mood, affect was flat and blunted. During this assessment pt was notably tearful, weepy and sad. She reported being very anxious attributing it to being in a new environment. Emotional support and reassurance were provided. PRN hydroxyzine was administered with relief. Pt was encouraged to come out for meals....... ate 100% with good fluids intake.   Throughout the evening shift pt remained calm, less teary but isolative and sad. No scheduled medications during the evening  shift and no prns were given.

## 2024-04-26 NOTE — CONSULTS
Diagnostic Evaluation Consultation  Crisis Assessment    Patient Name: Mariana Blake  Age:  18 year old  Legal Sex: female  Gender Identity: female  Pronouns:   Race: White  Ethnicity: Not  or   Language: English      Patient was assessed: In person      Patient location: [facility] EMERGENCY DEPT                             [id number]    Referral Data and Chief Complaint  Mariana Blake presents to the ED with family/friends. Patient is presenting to the ED for the following concerns: Suicidal ideation, Depression, Anxiety, Worsening psychosocial stress.   Factors that make the mental health crisis life threatening or complex are:  Patient presents to [facility] ED and [facility] for concerns of increased SI risk. Patient has increased SI over the past two weeks endorsing various plans including overdose on medications. Patient has no prior attempts..      Informed Consent and Assessment Methods  Explained the crisis assessment process, including applicable information disclosures and limits to confidentiality, assessed understanding of the process, and obtained consent to proceed with the assessment.  Assessment methods included conducting a formal interview with patient, review of medical records, collaboration with medical staff, and obtaining relevant collateral information from family and community providers when available.  : done     Patient response to interventions: acceptance expressed, verbalizes understanding  Coping skills were attempted to reduce the crisis:  Reading     History of the Crisis   Patient has prior diagnosis of MDD, KHALIF, and SI. patient endorses struggles with body image her entire life and restricts eating as a result. Patient is noticably thin. She has been prescribed numerous medications over the years and most recently has been on Effexor 37.5 mg but is not taking this currently. Patient has not been seen emergently for MH issues until her 4/12/24  presentation to [facility] ED for SI and was discharged. Following this presentation, patient stopped taking her prescribed Effexor and has not been medicated since. Patient endorses body image issues and says she is very self critical. She endorses panic attacks of the freeze up type. Mom says she will hole up in her room for days. Mom has not noticed any overt anxiety attacks and was unaware until last week patient had this issue. Patient endorses overwhelming feeings of guilt for causing stress and needing to attend to her issues. She has engaged in SIB with cutting of her thighs, but nothing for several weeks currently. Patient has a care team with no current PCP. She is getting medication management through [facility] and [place] [facility], [facility], [facility], [facility]  [facility] locations. She has a therapist through [facility] and meets with twice weekly and has been meeting with her for over a year. Patient denies any AH/VH/HI and endorses SI in assessment. Patient is very sad, down, flat affect in assessment.    Brief Psychosocial History  Family:  Single, Children no  Support System:  Parent(s), Sibling(s)  Employment Status:  unemployed  Source of Income:  none  Financial Environmental Concerns:  none  Current Hobbies:  reading (loves reading. Unable to read currently due to symptoms of depression. Unable to focus.)  Barriers in Personal Life:  emotional concerns    Significant Clinical History  Current Anxiety Symptoms:  excessive worry, racing thoughts, anxious  Current Depression/Trauma:  helplessness, thoughts of death/suicide, impaired decision making, crying or feels like crying, difficulty concentrating, hopelessness, sadness, excessive guilt, low self esteem, avoidance, withdrawl/isolation, negativistic  Current Somatic Symptoms:  racing thoughts, excessive worry, anxious  Current Psychosis/Thought Disturbance:   (None  noted.)  Current Eating Symptoms:  loss of appetite  Chemical Use History:  Alcohol: None  Benzodiazepines: None  Opiates: None  Cocaine: None  Marijuana: None  Other Use: None  Withdrawal Symptoms:  (None noted.)  Addictions:  (None noted.)   Past diagnosis:  Anxiety Disorder, Depression (Potential restrictive eating disorder)  Family history:  Anxiety Disorder, Depression, Suicide Attempt  Past treatment:  Individual therapy, Family therapy, Primary Care, Psychiatric Medication Management, Day Treatment  Details of most recent treatment:  Currently seeing individual therapist x2 week for past year, on Effexor but not compliant currently.  Other relevant history:          Collateral Information  Is there collateral information: Yes     Collateral information name, relationship, phone number:  Briana Blake  [phone]    What happened today: Have been trying to get services set up but no luck. She needs help.     What is different about patient's functioning: We were told we would be called after her d/c from [facility]. No calls. On Tuesday we did an intake through  for day treatment 10-1pm and said they would contact us on MyChart of directly, no calls. Followed up and left messages, no call backs. Emailed the  twice, no response. Called also. Nothing. We have tried so many different medications, nothing seems to work. She has body image issues and is restricting her eating. She has mentioned suicide openly by mentioning not wanting to be here, that things are too hard. She has only implied it.     Concern about alcohol/drug use:  No    Has patient made comments about wanting to kill themselves/others: yes    If d/c is recommended, can they take part in safety/aftercare planning:  yes    Additional collateral information:        Risk Assessment  [place] Suicide Severity Rating Scale Full Clinical Version:  Suicidal Ideation  Q1 Wish to be Dead (Lifetime): Yes  Q2 Non-Specific Active Suicidal  Thoughts (Lifetime): Yes  3. Active Suicidal Ideation with any Methods (Not Plan) Without Intent to Act (Lifetime): Yes  Q4 Active Suicidal Ideation with Some Intent to Act, Without Specific Plan (Lifetime): Yes  Q5 Active Suicidal Ideation with Specific Plan and Intent (Lifetime): Yes  Q6 Suicide Behavior (Lifetime): no     Suicidal Behavior (Lifetime)  Actual Attempt (Lifetime): No  Has subject engaged in non-suicidal self-injurious behavior? (Lifetime): No  Interrupted Attempts (Lifetime): No  Aborted or Self-Interrupted Attempt (Lifetime): No  Preparatory Acts or Behavior (Lifetime): No    [place] Suicide Severity Rating Scale Recent:   Suicidal Ideation (Recent)  Q1 Wished to be Dead (Past Month): yes  Q2 Suicidal Thoughts (Past Month): yes  Q3 Suicidal Thought Method: yes  Q4 Suicidal Intent without Specific Plan: yes  Q5 Suicide Intent with Specific Plan: yes  Level of Risk per Screen: high risk  Intensity of Ideation (Recent)  Most Severe Ideation Rating (Past 1 Month): 4  Description of Most Severe Ideation (Past 1 Month): Overdose on medications  Frequency (Past 1 Month): Many times each day  Duration (Past 1 Month): More than 8 hours/persistent or continuous  Controllability (Past 1 Month): Can control thoughts with some difficulty  Deterrents (Past 1 Month): Deterrents definitely stopped you from attempting suicide  Reasons for Ideation (Past 1 Month): Completely to end or stop the pain (You couldn't go on living with the pain or how you were feeling)  Suicidal Behavior (Recent)  Actual Attempt (Past 3 Months): No  Total Number of Actual Attempts (Past 3 Months): 0  Actual Attempt Description (Past 3 Months): None noted.  Has subject engaged in non-suicidal self-injurious behavior? (Past 3 Months): Yes  Interrupted Attempts (Past 3 Months): No  Total Number of Interrupted Attempts (Past 3 Months): 0  Interrupted Attempt Description (Past 3 Months): None noted.  Aborted or Self-Interrupted Attempt (Past  3 Months): No  Total Number of Aborted or Self-Interrupted Attempts (Past 3 Months): 0  Aborted or Self-Interrupted Attempt Description (Past 3 Months): None noted.  Preparatory Acts or Behavior (Past 3 Months): Yes  Total Number of Preparatory Acts (Past 3 Months): 1  Preparatory Acts or Behavior Description (Past 3 Months): Researching suicide on line.    Environmental or Psychosocial Events: other life stressors, work or task failure, social isolation, helplessness/hopelessness, unemployment/underemployment  Protective Factors: Protective Factors: strong bond to family unit, community support, or employment, lives in a responsibly safe and stable environment, good treatment engagement, supportive ongoing medical and mental health care relationships, reality testing ability, able to access care without barriers    Does the patient have thoughts of harming others? Feels Like Hurting Others: no  Previous Attempt to Hurt Others: no  Current presentation:  (Calm, cooperative, conversational, mildly labile. Profoundly depressed.)  Is the patient engaging in sexually inappropriate behavior?: no    Is the patient engaging in sexually inappropriate behavior?  no        Mental Status Exam   Affect: Constricted, Labile  Appearance: Appropriate  Attention Span/Concentration: Attentive  Eye Contact: Variable    Fund of Knowledge: Appropriate   Language /Speech Content: Fluent  Language /Speech Volume: Normal  Language /Speech Rate/Productions: Normal  Recent Memory: Intact  Remote Memory: Intact  Mood: Depressed, Sad  Orientation to Person: Yes   Orientation to Place: Yes  Orientation to Time of Day: Yes  Orientation to Date: Yes     Situation (Do they understand why they are here?): Yes  Psychomotor Behavior: Normal  Thought Content: Suicidal  Thought Form: Intact    Medication  Psychotropic medications:   Medication Orders - Psychiatric (From admission, onward)      None             Current Care Team  Patient Care Team:  No  Ref-Primary, Physician as PCP - Elizabeth Baldwin PA-C as Assigned PCP  Radha Smith Psy.D, LP as Assigned Behavioral Health Provider    Diagnosis  Patient Active Problem List   Diagnosis    Inflammation of eyelid    Major depression, recurrent (H24)    Generalized anxiety disorder    Suicide ideation       Primary Problem This Admission  Active Hospital Problems    Generalized anxiety disorder      Major depression, recurrent (H24)      Suicide ideation        Clinical Summary and Substantiation of Recommendations   Patient is agreeable to stay at [facility] to stabilize. She remains suicidal with plan to overdose but no intent to act on this. She has no prior attempts. Patient stopped taking her prescribed Effexor after her presentation to [facility] on 4/12/24 and is not taking any medication currently, having tried many in the past.  Patient is labile and teary in assessment, closed off but able to answer all questions posed. Patient to remain on OBS at [facility] and check in tomorrow.    Patient coping skills attempted to reduce the crisis:  Reading    Disposition  Recommended disposition: Observation        Reviewed case and recommendations with attending provider. Attending Name: GLENDY Fuchs       Attending concurs with disposition: yes       Patient and/or validated legal guardian concurs with disposition:   yes       Final disposition:  observation    Legal status on admission: Voluntary/Patient has signed consent for treatment    Assessment Details   Total duration spent with the patient: 45 min     CPT code(s) utilized: 82586 - Psychotherapy for Crisis - 60 (30-74*) min    Serge Patsrana MA Psychotherapist  [facility] - Triage & Transition Services  Callback: [phone]

## 2024-04-26 NOTE — PROGRESS NOTES
Patient agrees to discharge plan. Discharge instructions reviewed with patient including follow-up care plan. Reviewed safety plan and outpatient resources. Denies SI and HI. Contracts for safety. All belongings that were brought into the hospital have been returned to patient. Escorted off the unit at 545 accompanied by [facility] staff. Discharged to [facility] [address] [place] via EMS.

## 2024-04-26 NOTE — ED NOTES
Pt met with [facility] and is now resting on the recliner watching tv. She presents as depressed, has a flat affect, is soft spoken and is observed hugging herself as she sits. Pt declines any needs at this time.

## 2024-04-26 NOTE — DISCHARGE INSTRUCTIONS
Behavioral Discharge Planning and Instructions    Summary: You were admitted on 4/26/2024  due to Suicidal Ideations.  You were treated by Kenisha WIGGINS DNP and discharged on 5/1/24 from [facility]    Main Diagnosis:   Major Depressive Disorder, recurrent, severe, without psychosis  Suicidal ideation  Generalized anxiety disorder  Borderline vitamin D deficiency    Health Care Follow-up:   Partial Hospitalization Program Intake: Virtual - 5/2 at 8am  Program: [facility]   Ph: [phone]  -They will email or text you a link for this appointment at the time of the appointment. You will begin the first day of in person programmatic care on Friday 5/3 and they will provide all the information for the first day during your intake.    Psychiatry Appointment: Date/Time: Wednesday May 29th at 11am  - [place]  Psychiatrist Dr. Joe Parikh     Clinic: [facility]  Phone Number: [phone]    -Please schedule with your therapist prior to this appointment, you will need to be scheduled to see your therapist prior to this appointment to keep it.       Therapist:   Name/Clinic: Laurel Medeiros MA, LMFT- [facility]  Number: [phone]  -Please schedule with this therapist for after your partial hospitalization program.        Information will be faxed to your outpatient providers to ensure a healthy continuity of care for you.     Attend all scheduled appointments with your outpatient providers. Call at least 24 hours in advance if you need to reschedule an appointment to ensure continued access to your outpatient providers.     Major Treatments, Procedures and Findings:  You were provided with: a psychiatric assessment, assessed for medical stability, medication evaluation and/or management, group therapy, individual therapy, and milieu management    Symptoms to Report: feeling more aggressive, increased confusion, losing more sleep, mood getting worse,  "or thoughts of suicide    Early warning signs can include: increased depression or anxiety sleep disturbances increased thoughts or behaviors of suicide or self-harm     Safety and Wellness:  Take all medicines as directed.  Make no changes unless your doctor suggests them.      Follow treatment recommendations.  Refrain from alcohol and non-prescribed drugs.  Ask your support system to help you reduce your access to items that could harm yourself or others. Items could include:  Firearms  Medicines (both prescribed and over-the-counter)  Knives and other sharp objects  Ropes and like materials  Car keys  If there is a concern for safety, call 911. If there is a concern for safety, call 911.    Resources:   Crisis Intervention: 685.342.2013 or 601-578-0888 (TTY: 486.501.7069).  Call anytime for help.  National [place] on Mental Illness (www.[place].zain.org): 241.106.8006 or 655-800-8999.  [place] Association for Children's Mental Health (www.mac.org): 828.992.1008.  Alcoholics Anonymous (www.alcoholics-anonymous.org): Check your phone book for your local chapter.  Suicide Awareness Voices of Education (SAVE) (www.save.org): 259-997-OHEV (5093)  National Suicide Prevention Line (www.mentalhealthmn.org): 669-969-TMHU (2725)  Mental Health Consumer/Survivor Network of [place] (www.mhcsn.net): 340.303.5227 or 566-493-6488  Mental Health Association of [place] (www.mentalhealth.org): 971.599.9859 or 062-042-6094  Self- Management and Recovery Training., SMART-- Toll free: 956.645.3228  www.TalkwheelrecPNMsofty.org  [facility] [place] Crisis Response 571 112-5741  [facility] Crisis Response 133-410-1225  [facility] Crisis Response 231-212-8613  [facility] Crisis (COPE) Response - Adult [phone]  [facility] Crisis Response - Adult 021 635-9387  Text 4 Life: txt \"LIFE\" to 30651 for immediate support and crisis intervention  Crisis text line: Text \"[place]\" to 089913. Free, confidential, 24/7.  Crisis Intervention: 482.144.3796 or " 541.831.5923. Call anytime for help.   [facility] Mental Health Crisis Team - Child: 361.372.1680  [facility] Children's Mental Health Crisis Response Team - Child: [phone]       [facility] (National [place] on Mental Illness) improves the lives of children and adults with mental illnesses and their families by providing free classes on mental illnesses and support groups for adults with mental illnesses, parents and family members. For more information: Phone: 910.598.5109 Toll free: 1-139-TXXO-HELPS Website: www.namSwitchboardThe Arena Group.[email or web address]://[email or web address] Hospitalps.[email or web address]/      General Medication Instructions:   See your medication sheet(s) for instructions.   Take all medicines as directed.  Make no changes unless your doctor suggests them.   Go to all your doctor visits.  Be sure to have all your required lab tests. This way, your medicines can be refilled on time.  Do not use any drugs not prescribed by your doctor.  Avoid alcohol.    Advance Directives:   Scanned document on file with [facility]? No scanned doc  Is document scanned? Pt states no documents  Honoring Choices Your Rights Handout: Informed and given  Was more information offered? Materials given    The Treatment team has appreciated the opportunity to work with you. If you have any questions or concerns about your recent admission, you can contact the unit which can receive your call 24 hours a day, 7 days a week. They will be able to get in touch with a Provider if needed. The unit number is [phone] .

## 2024-04-26 NOTE — PHARMACY-ADMISSION MEDICATION HISTORY
Pharmacist Admission Medication History    Admission medication history is complete. The information provided in this note is only as accurate as the sources available at the time of the update.    Medication reconciliation/reorder completed by provider prior to medication history? No    Information Source(s): Patient and Patient's pharmacy via phone    Pertinent Information: Discussed medication history with patient who reported not taking anything for 2 weeks but did confirm to previously be taking quetiapine and venlafaxine.     Changes made to PTA medication list:  Added: None  Deleted: APAP, IBU  Changed: Quetiapine from 50mg TO 25mg daily    Allergies reviewed with patient and updates made in EHR: yes    Medication History Completed By: MARY HARVEY RPH 4/26/2024 3:13 PM    Prior to Admission medications    Medication Sig Last Dose Taking? Auth Provider Long Term End Date   QUEtiapine (SEROQUEL) 50 MG tablet Take 25 mg by mouth daily Past Month Yes Unknown, Entered By History Yes    venlafaxine (EFFEXOR XR) 75 MG 24 hr capsule Take 75 mg by mouth daily Past Month Yes Reported, Patient Yes

## 2024-04-26 NOTE — ED PROVIDER NOTES
[facility] Unit - Initial Psychiatric Observation Note  [facility] Emergency Department  Observation Initiation Date: Apr 25, 2024    Mariana Blake MRN: [id number]   Age: 18 year old YOB: 2005     History       Telemedicine Visit: The patient's condition can be safely assessed and treated via synchronous audio and visual telemedicine encounter.      Reason for Telemedicine Visit: Services only offered telehealth      Originating Site (Patient Location): [facility] emergency department unit    Distant Site (Provider Location): Provider Remote Setting - Home office in [place]    Consent:  The patient/guardian has verbally consented to: the potential risks and benefits of telemedicine (video visit or phone) versus in person care; bill my insurance or make self-payment for services provided; and responsibility for payment of non-covered services.     Mode of Communication: [employer], a secure HIPAA compliant video platform      Start time:  9:00 pm  End time:   9:30 pm     Chief Complaint   Patient presents with    Suicidal     HPI  Mariana Blake is a 18 year old female with a past history notable for a history of MDD and anxiety who presented to the ED with increasing depression and suicide ideation with intention and thoughts of various plans. She was cleared medically in the emergency department and transferred to [facility] for additional assessment and treatment planning. At the time of the assessment today, 4/25/24 she is nearing 4.5 hours in emergency care.    Please see the [facility] assessment & reassessment (if available), ED physician notes and nursing notes for additional information and collateral content if available. All were reviewed prior to meeting with the patient. Pertinent content includes the following:There have been many previous medication trials. She was seen recently in the ED at [place], [place] and was referred for programmatic care. She has a PMHNP she sees and a therapist she  "meets with twice weekly. She presented today with the support of her mother. She lives with her parents.    Mariana is interviewed today while she is in a conference room in the [facility] unit. Her affect is flat and she is intermittently slightly tearful. Her concentration is mostly intact, but she seems to have difficulty with attention and decisions. She endorses active SI and SIB with intention and plans but without action. She denies HI, A/V hallucinations, delusions or paranoia. Her answers to questions are short and she appears to be suffering psychically. We discussed options for care including transitioning to an out-patient IOP/PHP, but she agreed that her symptoms are at this time significant enough that an inpatient admission is warranted. She also agrees to medication changes to support her recovery.      Past Medical History  Past Medical History:   Diagnosis Date    NO ACTIVE PROBLEMS      Past Surgical History:   Procedure Laterality Date    NO HISTORY OF SURGERY       ACETAMINOPHEN  CHILDRENS MOTRIN OR  IBUPROFEN  venlafaxine (EFFEXOR XR) 37.5 MG 24 hr capsule      No Known Allergies  Family History  Family History   Problem Relation Age of Onset    Diabetes Father         type one     Social History   Social History     Tobacco Use    Smoking status: Never     Passive exposure: Never    Smokeless tobacco: Never   Vaping Use    Vaping status: Never Used   Substance Use Topics    Alcohol use: No    Drug use: No          Review of Systems  A medically appropriate review of systems was performed with pertinent positives and negatives noted in the HPI, and all other systems negative.    Physical Examination   BP: 110/74  Pulse: 69  Temp: 97.3  F (36.3  C)  Resp: 18  Height: 157.5 cm (5' 2\")  Weight: 45 kg (99 lb 4.8 oz)  SpO2: 100 %    Physical Exam  General: Appears stated age.   Neuro: Alert and fully oriented. Extremities appear to demonstrate normal strength on visual inspection.   Integumentary/Skin: " "no rash visualized, normal color    Psychiatric Examination   Appearance: awake, alert  Attitude:  guarded and somewhat cooperative  Eye Contact:  fair  Mood:   \"I don't know\"  Affect:  intensity is flat  Speech:  clear, coherent, decreased prosody, and paucity of speech  Psychomotor Behavior:  no evidence of tardive dyskinesia, dystonia, or tics  Thought Process:  linear  Associations:  no loose associations  Thought Content:  no evidence of psychotic thought and active suicidal ideation present  Insight:  limited  Judgement:  limited  Oriented to:  time, person, and place  Attention Span and Concentration:  limited  Recent and Remote Memory:  fair  Language: able to name/identify objects without impairment  Fund of Knowledge: intact with awareness of current and past events    ED Course     ED Course as of 04/25/24 2132   Thu Apr 25, 2024   1713 I evaluated the patient.        Labs Ordered and Resulted from Time of ED Arrival to Time of ED Departure - No data to display    Assessments & Plan (with Medical Decision Making)   Patient presenting with a history of MDD and anxiety who presented to the ED with increasing depression and suicide ideation with intention and thoughts of various plans. The level of her symptoms indicate that an inpatient stay is warranted. Together, through a shared decision-making process, a plan of care was determined as is noted below.  Nursing notes reviewed noting no acute issues.     I have reviewed the assessment completed by the [facility].     During the observation period, the patient did not require medications for agitation, and did not require restraints/seclusion for patient and/or provider safety.     The patient was found to have a psychiatric condition that would benefit from an observation stay in the emergency department for further psychiatric stabilization and/or coordination of a safe disposition. The observation plan includes serial assessments of psychiatric condition, " potential administration of medications if indicated, further disposition pending the patient's psychiatric course during the monitoring period.     Preliminary diagnosis:    ICD-10-CM    1. Suicidal ideation  R45.851       2. Severe episode of recurrent major depressive disorder, without psychotic features (H)  F33.2            Treatment Plan:  - Admit for Observation with the intention of transitioning to an inpatient bed, with a request to transition to [facility] inpatient young adult unit.  - Will start escitalopram 5 mg today to target depression; she reports a previous trial of citalopram without any significant side effects, so would increase to 10 mg quickly.   - [facility] standing orders for pain, anxiety, agitation, insomnia and nausea.  -Medication education provided this visit includes, rationale for medication, importance of compliance, medication interactions, and common side effects.   -Supportive psychotherapy provided regarding patient's stressors and past mental health symptoms problem solving ways to improve overall wellness and cope with acute and chronic mental health symptoms.  -Observe overnight and reassess tomorrow.       --  ROSALIE Fuchs CNP   [facility] EMERGENCY DEPT  [facility] Unit      Alie Rice APRN CNP  04/26/24 0043

## 2024-04-27 PROCEDURE — 250N000013 HC RX MED GY IP 250 OP 250 PS 637: Performed by: NURSE PRACTITIONER

## 2024-04-27 PROCEDURE — 128N000002 HC R&B CD/MH ADOLESCENT

## 2024-04-27 PROCEDURE — G0177 OPPS/PHP; TRAIN & EDUC SERV: HCPCS

## 2024-04-27 RX ADMIN — DESVENLAFAXINE 25 MG: 25 TABLET, EXTENDED RELEASE ORAL at 09:59

## 2024-04-27 ASSESSMENT — ACTIVITIES OF DAILY LIVING (ADL)
ADLS_ACUITY_SCORE: 28

## 2024-04-27 NOTE — PLAN OF CARE
Goal Outcome Evaluation:      Plan of Care Reviewed With: patient  Goal Outcome Evaluation:    Plan of Care Reviewed With: patient     Pt slept 5.75 hrs this shift. Safety rounds completed every 15 minutes throughout the night. Visible respirations noted with no signs of distress. No safety or behavior concerns noted. Continue with plan of care.

## 2024-04-27 NOTE — PLAN OF CARE
"  Problem: Adult Behavioral Health Plan of Care  Goal: Plan of Care Review  Outcome: Progressing  Flowsheets (Taken 4/27/2024 1014)  Plan of Care Reviewed With: patient   Goal Outcome Evaluation:      Plan of Care Reviewed With: patient    Precaution: Suicide  Legal:Vol     Pt sleeping at the start of shift  Pt ate 75% of breakfast with much encouragement and 25 of lunch. Flat affect. VS stable   Pt very tearful during assessment, mostly answering \"I don't know\". Very isolative and withdrawn  Encouraged to be out in the lounge and color. Pt was agreeable.   Spent most of the shift in the lounge with headset on coloring.   After the pt had time to color in the lounge- pt went back to room stating \"I just feel tired\" and was tearful again. Pt denies SI/SIB, AH,VH.   Pt appears to be anxious as this is first admission. Emotional support provided and care explained after pt stated \"I don't how to ask for help\"  Pt was reminded on safety and pt contracted to be safe while on the unit.     No PRN given   Pt was medication compliant  Pt denied any pain or medical concern       "

## 2024-04-28 PROCEDURE — H2032 ACTIVITY THERAPY, PER 15 MIN: HCPCS

## 2024-04-28 PROCEDURE — 250N000013 HC RX MED GY IP 250 OP 250 PS 637: Performed by: PSYCHIATRY & NEUROLOGY

## 2024-04-28 PROCEDURE — 128N000002 HC R&B CD/MH ADOLESCENT

## 2024-04-28 PROCEDURE — 250N000013 HC RX MED GY IP 250 OP 250 PS 637: Performed by: NURSE PRACTITIONER

## 2024-04-28 PROCEDURE — G0177 OPPS/PHP; TRAIN & EDUC SERV: HCPCS

## 2024-04-28 RX ADMIN — HYDROXYZINE HYDROCHLORIDE 25 MG: 25 TABLET, FILM COATED ORAL at 08:43

## 2024-04-28 RX ADMIN — DESVENLAFAXINE 25 MG: 25 TABLET, EXTENDED RELEASE ORAL at 08:42

## 2024-04-28 ASSESSMENT — ACTIVITIES OF DAILY LIVING (ADL)
ADLS_ACUITY_SCORE: 28
DRESS: INDEPENDENT
ADLS_ACUITY_SCORE: 28
HYGIENE/GROOMING: SHOWER
ADLS_ACUITY_SCORE: 28

## 2024-04-28 NOTE — PLAN OF CARE
Problem: Adult Behavioral Health Plan of Care  Goal: Adheres to Safety Considerations for Self and Others  Outcome: Progressing     Problem: Suicide Risk  Goal: Absence of Self-Harm  Outcome: Progressing     Pt slept through the night without distress. Pt reported no pain or discomfort. No problems with behavior. No concerns reported by pt. Pt slept 6.5 hours.  Staff will continue to monitor.

## 2024-04-28 NOTE — PLAN OF CARE
Problem: Adult Behavioral Health Plan of Care  Goal: Optimized Coping Skills in Response to Life Stressors  Outcome: Not Progressing  Problem: Adult Behavioral Health Plan of Care  Goal: Adheres to Safety Considerations for Self and Others  Outcome: Progressing   Goal Outcome Evaluation:    Mental health  Pt is isolative in her room, withdrawn, hypoactive,with flat affect. Pt encouraged to come out of her room and attend groups too. Pt spent time in the lounge, reading a book before dinner and ate in the dining room.    Patient denies SI/SIB/A & V hallucinations, endorses depression and anxiety of 8/10 but refused offered PRN medication. Pt contracts for safety. Patient seen in the milieu making phone calls crying.    Denies pain.    Elimination: pt cannot remember when she had last BM but denies being constipated.    Intake: 45% for dinner, ate snacks    PRN; none    Pt attended group, no behaviors observed.

## 2024-04-28 NOTE — PLAN OF CARE
Problem: Suicide Risk  Goal: Absence of Self-Harm  Outcome: Progressing  Intervention: Assess Risk to Self and Maintain Safety  Recent Flowsheet Documentation  Taken 4/28/2024 1825 by Loren Carpio RN  Behavior Management: behavioral plan reviewed  Intervention: Promote Psychosocial Wellbeing  Recent Flowsheet Documentation  Taken 4/28/2024 1825 by Loren Carpio RN  Supportive Measures: active listening utilized  Sleep/Rest Enhancement:   relaxation techniques promoted   regular sleep/rest pattern promoted  Family/Support System Care:   self-care encouraged   involvement promoted   support provided     Problem: Psychotic Symptoms  Goal: Psychotic Symptoms  Description: Signs and symptoms of listed problems will be absent or manageable.  Outcome: Progressing  Flowsheets (Taken 4/28/2024 1829)  Psychotic Symptoms Assessed:   anxiety   suicidality   self injury   affect   mood   insight   orientation  Psychotic Symptoms Present:   anxiety   affect   mood   Goal Outcome Evaluation:    Plan of Care Reviewed With: patient    Pt presented as sad and guarded with flat affect, isolated to her room. Upon approach, pt was agreeable to come out of room to check in.Pt became teary, endorsed chronic depression, pt denied having any triggers. Pt reported chronic SI thoughts without any plans, contracted for safety. Pt was encouraged to come out room, pt was agreeable, pt was able to play puzzle and socialize with writer in the lounge area. Questions encouraged, active listening promoted.Pt reported feeling better,contracted for safety,will continue to monitor.

## 2024-04-28 NOTE — PLAN OF CARE
Occupational Therapy     04/27/24 2100   Group Therapy Session   Group Attendance attended group session   Time Session Began 2010   Time Session Ended 2110   Total Time (minutes) 60   Total # Attendees 5   Group Type expressive therapy;task skill   Group Topic Covered coping skills/lifestyle management;leisure exploration/use of leisure time;structured socialization   Group Session Detail OT: Education on a healthy support system and creative hands-on endeavor (card making) to increase recognition of support system, concentration, focus, attention to task/detail, decision making, problem solving, frustration tolerance, task follow through, coping with stress, healthy leisure engagement, creative expression, and social engagement   Patient Response/Contribution cooperative with task;listened actively;other (see comments)  (pleasant; engaged)   Patient Participation Detail Pt reported during check-in that  my sister  is someone in their support system. Pt sat among peers to complete presented activity and engaged in appropriate reciprocal social interactions with peers. Pt worked in a mostly neat and tidy manner to complete project. Pt verbalized understanding importance of a solid support system in a healthy lifestyle.

## 2024-04-28 NOTE — PLAN OF CARE
"Goal Outcome Evaluation:    Plan of Care Reviewed With: patient        Problem: Suicide Risk  Goal: Absence of Self-Harm  Outcome: Progressing     Problem: Psychotic Symptoms  Goal: Social and Therapeutic (Psychotic Symptoms)  Description: Signs and symptoms of listed problems will be absent or manageable.  Outcome: Progressing       Patient needed minimal encouragement to come out for meals, ate about 50% of breakfast and 100% of her lunch. Patient requested and had a shower and changed to clean clothes. Patient continues to be present with flat affect, she is isolative and withdrawn to room and does not interact with peers. Patient endorsed anxiety 7/10 and depression 8/10, denied SI/SIB/HI/AVH.patient was compliant with medication administration and was given prn hydroxyzine x1 during shift.   Blood pressure 112/77, pulse 81, temperature 98  F (36.7  C), temperature source Temporal, resp. rate 16, height 1.575 m (5' 2\"), weight 43.5 kg (96 lb), last menstrual period 03/01/2024, SpO2 97%.    "

## 2024-04-28 NOTE — PLAN OF CARE
04/28/24 1507   Group Therapy Session   Group Attendance attended group session   Time Session Began 1115   Time Session Ended 1200   Total Time (minutes) 45   Total # Attendees 5   Group Type life skill;recreation;task skill   Group Topic Covered cognitive activities;balanced lifestyle;coping skills/lifestyle management;emotions/expression;leisure exploration/use of leisure time;problem-solving   Group Session Detail General Health and Coping Skills: Origami and Affirmations - participants complete origami art while discussing learning styles, affirmations, and self-compassion.   Patient Participation Detail Pt participated in a structured group activity to complete origami projects to learn about learning styles and self-compassion. Pt seemed quiet but was friendly and cooperative during group. Pt was able to follow the Writer's demonstrations to complete the origami projects; work appeared to match the pattern. Pt appeared engaged in the activity; demonstrated good focus and attention; and asked for assistance with challenging steps in the project.

## 2024-04-29 PROCEDURE — 90853 GROUP PSYCHOTHERAPY: CPT

## 2024-04-29 PROCEDURE — 250N000013 HC RX MED GY IP 250 OP 250 PS 637: Performed by: PSYCHIATRY & NEUROLOGY

## 2024-04-29 PROCEDURE — 250N000013 HC RX MED GY IP 250 OP 250 PS 637: Performed by: NURSE PRACTITIONER

## 2024-04-29 PROCEDURE — 99233 SBSQ HOSP IP/OBS HIGH 50: CPT | Performed by: NURSE PRACTITIONER

## 2024-04-29 PROCEDURE — G0177 OPPS/PHP; TRAIN & EDUC SERV: HCPCS

## 2024-04-29 PROCEDURE — 128N000002 HC R&B CD/MH ADOLESCENT

## 2024-04-29 PROCEDURE — 90837 PSYTX W PT 60 MINUTES: CPT

## 2024-04-29 RX ORDER — DESVENLAFAXINE 50 MG/1
50 TABLET, FILM COATED, EXTENDED RELEASE ORAL DAILY
Status: DISCONTINUED | OUTPATIENT
Start: 2024-04-29 | End: 2024-05-01 | Stop reason: HOSPADM

## 2024-04-29 RX ADMIN — Medication 125 MCG: at 10:20

## 2024-04-29 RX ADMIN — DESVENLAFAXINE 50 MG: 50 TABLET, FILM COATED, EXTENDED RELEASE ORAL at 10:20

## 2024-04-29 RX ADMIN — HYDROXYZINE HYDROCHLORIDE 25 MG: 25 TABLET, FILM COATED ORAL at 21:17

## 2024-04-29 RX ADMIN — SENNOSIDES AND DOCUSATE SODIUM 1 TABLET: 8.6; 5 TABLET ORAL at 23:02

## 2024-04-29 ASSESSMENT — ACTIVITIES OF DAILY LIVING (ADL)
DRESS: SCRUBS (BEHAVIORAL HEALTH);INDEPENDENT;STREET CLOTHES
HYGIENE/GROOMING: INDEPENDENT
ADLS_ACUITY_SCORE: 28
ORAL_HYGIENE: INDEPENDENT
ADLS_ACUITY_SCORE: 28
HYGIENE/GROOMING: INDEPENDENT
ADLS_ACUITY_SCORE: 28
LAUNDRY: WITH SUPERVISION

## 2024-04-29 NOTE — PLAN OF CARE
"  Problem: Adult Behavioral Health Plan of Care  Goal: Plan of Care Review  Outcome: Progressing   Goal Outcome Evaluation:    Patient slept in some this morning, but did get up for breakfast--ate about 50%.  Returned to her room.  Affect is flat although she smiled several times during interview.      Quiet and soft spoken.    Eyes darting back and forth avoiding eye contact with this writer--appears to be anxiety.  She was able to share some history with this writer--lives at home, first psych admit, has a virtual therapist whom she has seen for about a year and feels she has a good rapport with her. 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 (This writer reinforced the importance of taking care of herself).   Also commented that she feels as now as an adult she should be leaving her parents' home--although she is only 18.    Continues to endorse intermittent SI and became tearful stating it was \"tiresome\" going back and forth.  Currently denies SIB.  Rates depression as 7, anxiety as 8.  Denies hallucinations.    Strongly encouraged group attendance. And did attend a group.    States she was planning to be a \"sub\" at the school this summer.    Also is hopeful to return to college at some point--  so does have some future orientation.       "

## 2024-04-29 NOTE — PLAN OF CARE
BEH IP Unit Acuity Rating Score (UARS)  Patient is given one point for every criteria they meet.    CRITERIA SCORING   On a 72 hour hold, court hold, committed, stay of commitment, or revocation. 0    Patient LOS on BEH unit exceeds 20 days. 0  LOS: 3   Patient under guardianship, 55+, otherwise medically complex, or under age 11. 0   Suicide ideation without relief of precipitating factors. 1   Current plan for suicide. 0   Current plan for homicide. 0   Imminent risk or actual attempt to seriously harm another without relief of factors precipitating the attempt. 0   Severe dysfunction in daily living (ex: complete neglect for self care, extreme disruption in vegetative function, extreme deterioration in social interactions). 1   Recent (last 7 days) or current physical aggression in the ED or on unit. 0   Restraints or seclusion episode in past 72 hours. 0   Recent (last 7 days) or current verbal aggression, agitation, yelling, etc., while in the ED or unit. 0   Active psychosis. 0   Need for constant or near constant redirection (from leaving, from others, etc).  0   Intrusive or disruptive behaviors. 0   Patient requires 3 or more hours of individualized nursing care per 8-hour shift (i.e. for ADLs, meds, therapeutic interventions). 0   TOTAL 2

## 2024-04-29 NOTE — PLAN OF CARE
Problem: Sleep Disturbance  Goal: Adequate Sleep/Rest  Outcome: Progressing   Goal Outcome Evaluation:          Pt appeared to have slept for 6.5 hours.  Respirations are even and unlabored.  No medical/safety/behavioral concerns this shift.  No medical/safety/behavioral concerns this shift.  No prn administered.  Pt remain on suicide precautions.

## 2024-04-29 NOTE — PLAN OF CARE
04/29/24 1516   Group Therapy Session   Group Attendance attended group session   Time Session Began 1020   Time Session Ended 1205   Total Time (minutes) 60   Total # Attendees 6   Group Type life skill;task skill   Patient Participation Detail Occupational therapy clinic to facilitate coping skill exploration, creative expression within personally meaningful activities, and clinical observation of social, cognitive, and kinesthetic performance skills. Pt response: Pt presented with calm mood, blunted affect. IND to initiate, gather materials, sequence, and adjust to workspace demands as needed for a creative expressive task. Demonstrated good focus, planning, and problem solving for selected task. Able to ask for assistance and appeared comfortable interacting with peers and staff.

## 2024-04-29 NOTE — PLAN OF CARE
"   04/29/24 1517   Group Therapy Session   Group Attendance attended group session   Time Session Began 1315   Time Session Ended 1410   Total Time (minutes) 55   Total # Attendees 7   Group Type life skill;psychoeducation   Patient Participation Detail General Health and Coping with a focus on sleep hygiene. Education included sleep self-assessment, factors affecting sleep, importance of sleep for occupational performance, cognition and overall well-being. Pts were guided through creation of an individualized sleep hygiene routine and weighted eye pillow. Pt engaged in a hands-on task involving creating their own weighted eye pillow. Pt was educated on safe and proper use of the pillow as a way to promote relaxation and rest. Pt Response: Demonstrated good engagement and was receptive to material offered. Pt contributed to the discussion, and identified \"quitting vaping at night and drinking tea instead\" as changes they intend to make in their sleep hygiene routine.            "

## 2024-04-29 NOTE — PLAN OF CARE
Team Note Due:  Friday    Assessment/Intervention/Current Symtoms and Care Coordination:  Chart review and met with team, discussed pt progress, symptomology, and response to treatment.  Discussed the discharge plan and any potential impediments to discharge.    [facility] made pc to [facility] to determine if they have an online PHP. [facility] was informed they do not.     [facility] met with pt. Pt reports that driving to the [place] is anxiety inducing for her so she would need her grandparents to drive her to the city if that is where programming is. She reports she doesn't want to rely on them driving her. [facility] discussed [facility] that has a location in [place]. Pt reports that she may be able to drive there and wanted more information.     [facility] made p.c to [facility]. They have the PHP in [place], but don't know how long the waitlist is. Potentially 1-2 weeks waitlist. [facility] printed details out on this program.     [facility] met with pt. Pt reports ambivalence about different programs. [facility] discussed options of [facility], and [facility]. Pt reports that her therapist wanted her to see a therapist about a potential eating disorder. CTC discussed this further. [facility] discussed eating disorder specific supports. Pt was not able to make any decisions on programming that she wanted to do at this time. [facility] informed her that [facility] can meet with her tomorrow to discuss this further.     [facility] was notified by provider that pt's mother reports pt will need a letter for work.     Discharge Plan or Goal:  Continue stabilization of mental health symptoms and establish a safe discharge plan.      Barriers to Discharge:  Patient requires further psychiatric stabilization due to current symptomology of SI and depression as well as medication management.      Referral Status:  Pt is interested in a PHP, was referred to [facility] at last ED stay, but transportation is a barrier. Pt lives in [place].      Legal Status:  Voluntary      Contacts:  Mother Briana ([phone]) and Dad Urbano TOUSSAINT signed      Upcoming Meetings and Dates/Important Information and next steps:  [facility] to meet with pt to discuss programmatic care in more depth.   Pt will need letter for work.

## 2024-04-29 NOTE — PLAN OF CARE
"Individual Therapy Note      Date of Service: April 29, 2024    Patient: Mariana goes by \"Mariana,\" uses she/her pronouns    Individuals Present: Willam Turcios [facility]    Session start: 1500  Session end: 1600  Session duration in minutes: 60    Patient Active Problem List   Diagnosis    Inflammation of eyelid    Major depression, recurrent (H24)    Generalized anxiety disorder    Suicide ideation    Suicidal ideation    Major depressive disorder with current active episode, unspecified depression episode severity, unspecified whether recurrent         Modality Used:Person Centered, Rapport Building, and Brief Therapy    Goals: Safety planning; identifying and practicing coping skills.    Patient Description of current symptoms: Patient endorses ongoing thoughts of not wanting to be alive; high anxiety; sadness.      Mental Status Exam:   Attitude: cooperative  Eye Contact: good  Mood: anxious and depressed  Affect: mood congruent  Speech: clear, coherent  Psychomotor Behavior: no evidence of tardive dyskinesia, dystonia, or tics  Thought Process:  logical  Associations: no loose associations  Thought Content: passive suicidal ideation present  Insight: good  Judgement: intact  Attention Span and Concentration: intact    Pt progress: Patient indicates that she feels guilty about many things and she has a difficult time making decisions.  She is trying to decide about aftercare programming and does not want to have to ask her grandparents to transport her to a program.  She feels undeserving of the help.  It is difficult for her to be on the inpatient unit as well and is feeling significant anxiety about that.  She indicates that she feels by taking a break from school and her job that she is disappointing her parents.  We discussed self-compassion as something to explore and therapist provided resources.  She was able to review and update her existing safety plan. She hopes to be able to discharge soon.  "     Treatment Objective(s) Addressed:   The focus of this session was on rapport building, identifying and practicing coping strategies, and safety planning     Progress Towards Goals and Assessment of Patient:   Patient is making some progress towards treatment goals as evidenced by lessening of intensity of suicidal thoughts, consideration of options for aftercare supports.       Therapeutic Intervention(s):   Provided active listening, unconditional positive regard, and validation. Engaged in safety planning.  Engaged in guided discovery, explored patient's perspectives and helped expand them through socratic dialogue. Reviewed healthy living that supports positive mental health, including looking at sleep hygiene, regular movement, nutrition, and regular socialization.    Plan/next step: Therapist will check in with patient again to offer additional individual sessions.    52266 - Psychotherapy (with patient) - 60 (53+*) min

## 2024-04-29 NOTE — PROGRESS NOTES
[facility], [place]   Psychiatric Progress Note        Interim History:   From H&P: Mariana Blake is a 18 year old female who presents with suicidal ideation over the past 2 weeks. She was seen in the ED at [facility] on 4/12, where she had been experiencing similar symptoms. After her discharge, she stopped taking her Effexor altogether, and has not been on any medications since. Regarding her suicidal thoughts, she does endorse various plans, but no intent to act on them. She lives at home with her parents, who do own guns that are locked away. She has been dealing with anxiety and depression for the past 4 years, and is currently seeing a therapist. No previous suicide attempts, recent acts of self-harm, homicidal ideation, hallucinations, cough, fever, or sore throat. No alcohol, tobacco, or illicit drug/marijuana use.     Team meeting report: The patient's care was discussed with the treatment team during the daily team meeting and/or staff's chart notes were reviewed.  Staff report patient has been calm, peasant, cooperative.  The patient attended few groups.  She was isolated.  Very quiet.  Eating well.  Rated depression and anxiety is high.  Slept well.  Med compliant.    Met with patient.  Continues to be tearful on and off.  States that she is still having suicidal thoughts but not all the time.  At times, she is more hopeful than other times.  Anxiety continues to be high and is primarily related to her employer.  She does not want to lose her job since that she likes it so much.  She feels guilty about leaving the job without and for time for them to find a different person.  The patient continues to feel depressed.  Does not know if she is any better.  Discussed disposition.  She is hoping to discharge sometimes this week.  We discussed the criteria is.  Discussed the blood work and supplementing vitamin D, she is agreeable.    Spoke with patients mother Briana Blake,  [phone].  Discussed the plan of care.  Requested to bring the genetic testing the patient had about a year ago.  She is requesting a letter to be sent to the employer that the patient was hospitalized.  She has not come this weekend to see the patient.  Does not know if she is feeling better.         Medications:     Current Facility-Administered Medications   Medication Dose Route Frequency Provider Last Rate Last Admin    cholecalciferol (VITAMIN D3) capsule 125 mcg  125 mcg Oral Daily GabriellaevKenisha booker APRN CNP   125 mcg at 04/29/24 1020    desvenlafaxine (PRISTIQ) 24 hr tablet 50 mg  50 mg Oral Daily GabriellaevaKenihsaa, APRN CNP   50 mg at 04/29/24 1020            Allergies:   No Known Allergies         Labs:     Recent Results (from the past 672 hour(s))   Comprehensive metabolic panel    Collection Time: 04/12/24  5:35 PM   Result Value Ref Range    Sodium 138 135 - 145 mmol/L    Potassium 3.8 3.4 - 5.3 mmol/L    Carbon Dioxide (CO2) 27 22 - 29 mmol/L    Anion Gap 10 7 - 15 mmol/L    Urea Nitrogen 6.5 6.0 - 20.0 mg/dL    Creatinine 0.55 0.51 - 0.95 mg/dL    GFR Estimate >90 >60 mL/min/1.73m2    Calcium 9.4 8.6 - 10.0 mg/dL    Chloride 101 98 - 107 mmol/L    Glucose 105 (H) 70 - 99 mg/dL    Alkaline Phosphatase 58 40 - 150 U/L    AST 22 0 - 35 U/L    ALT 16 0 - 50 U/L    Protein Total 7.7 6.4 - 8.3 g/dL    Albumin 4.7 3.5 - 5.2 g/dL    Bilirubin Total 0.3 <=1.2 mg/dL   Lipase    Collection Time: 04/12/24  5:35 PM   Result Value Ref Range    Lipase 22 13 - 60 U/L   Magnesium    Collection Time: 04/12/24  5:35 PM   Result Value Ref Range    Magnesium 2.3 1.7 - 2.3 mg/dL   Phosphorus    Collection Time: 04/12/24  5:35 PM   Result Value Ref Range    Phosphorus 3.6 2.5 - 4.5 mg/dL   HCG qualitative pregnancy (blood)    Collection Time: 04/12/24  5:35 PM   Result Value Ref Range    hCG Serum Qualitative Negative Negative   Salicylate level    Collection Time: 04/12/24  5:35 PM   Result  Value Ref Range    Salicylate <0.3   mg/dL   Acetaminophen level    Collection Time: 04/12/24  5:35 PM   Result Value Ref Range    Acetaminophen <5.0 (L) 10.0 - 30.0 ug/mL   Alcohol level blood    Collection Time: 04/12/24  5:35 PM   Result Value Ref Range    Alcohol ethyl <0.01 <=0.01 g/dL   CBC with platelets and differential    Collection Time: 04/12/24  5:35 PM   Result Value Ref Range    WBC Count 5.6 4.0 - 11.0 10e3/uL    RBC Count 4.66 3.80 - 5.20 10e6/uL    Hemoglobin 14.2 11.7 - 15.7 g/dL    Hematocrit 42.0 35.0 - 47.0 %    MCV 90 78 - 100 fL    MCH 30.5 26.5 - 33.0 pg    MCHC 33.8 31.5 - 36.5 g/dL    RDW 12.4 10.0 - 15.0 %    Platelet Count 335 150 - 450 10e3/uL    % Neutrophils 66 %    % Lymphocytes 23 %    % Monocytes 8 %    % Eosinophils 2 %    % Basophils 0 %    % Immature Granulocytes 0 %    NRBCs per 100 WBC 0 <1 /100    Absolute Neutrophils 3.7 1.6 - 8.3 10e3/uL    Absolute Lymphocytes 1.3 0.8 - 5.3 10e3/uL    Absolute Monocytes 0.5 0.0 - 1.3 10e3/uL    Absolute Eosinophils 0.1 0.0 - 0.7 10e3/uL    Absolute Basophils 0.0 0.0 - 0.2 10e3/uL    Absolute Immature Granulocytes 0.0 <=0.4 10e3/uL    Absolute NRBCs 0.0 10e3/uL   UA with Microscopic reflex to Culture    Collection Time: 04/12/24  7:40 PM    Specimen: Urine, Clean Catch   Result Value Ref Range    Color Urine Yellow Colorless, Straw, Light Yellow, Yellow    Appearance Urine Cloudy (A) Clear    Glucose Urine Negative Negative mg/dL    Bilirubin Urine Negative Negative    Ketones Urine Negative Negative mg/dL    Specific Gravity Urine 1.018 1.003 - 1.035    Blood Urine Negative Negative    pH Urine 7.0 5.0 - 7.0    Protein Albumin Urine Negative Negative mg/dL    Urobilinogen Urine 2.0 Normal, 2.0 mg/dL    Nitrite Urine Negative Negative    Leukocyte Esterase Urine Negative Negative    Bacteria Urine Few (A) None Seen /HPF    Mucus Urine Present (A) None Seen /LPF    Amorphous Crystals Urine Few (A) None Seen /HPF    RBC Urine 0 <=2 /HPF    WBC  Urine 4 <=5 /HPF    Squamous Epithelials Urine 9 (H) <=1 /HPF   Urine Drug Screen Panel    Collection Time: 04/12/24  7:40 PM   Result Value Ref Range    Amphetamines Urine Screen Negative Screen Negative    Barbituates Urine Screen Negative Screen Negative    Benzodiazepine Urine Screen Negative Screen Negative    Cannabinoids Urine Screen Negative Screen Negative    Cocaine Urine Screen Negative Screen Negative    Fentanyl Qual Urine Screen Negative Screen Negative    Opiates Urine Screen Negative Screen Negative    PCP Urine Screen Negative Screen Negative   HCG qualitative urine    Collection Time: 04/25/24  9:38 PM   Result Value Ref Range    hCG Urine Qualitative Negative Negative   Urine Drug Screen Panel    Collection Time: 04/25/24  9:38 PM   Result Value Ref Range    Amphetamines Urine Screen Negative Screen Negative    Barbituates Urine Screen Negative Screen Negative    Benzodiazepine Urine Screen Negative Screen Negative    Cannabinoids Urine Screen Negative Screen Negative    Cocaine Urine Screen Negative Screen Negative    Fentanyl Qual Urine Screen Negative Screen Negative    Opiates Urine Screen Negative Screen Negative    PCP Urine Screen Negative Screen Negative   Comprehensive metabolic panel    Collection Time: 04/25/24 10:18 PM   Result Value Ref Range    Sodium 140 135 - 145 mmol/L    Potassium 4.3 3.4 - 5.3 mmol/L    Carbon Dioxide (CO2) 26 22 - 29 mmol/L    Anion Gap 10 7 - 15 mmol/L    Urea Nitrogen 7.7 6.0 - 20.0 mg/dL    Creatinine 0.57 0.51 - 0.95 mg/dL    GFR Estimate >90 >60 mL/min/1.73m2    Calcium 9.4 8.6 - 10.0 mg/dL    Chloride 104 98 - 107 mmol/L    Glucose 93 70 - 99 mg/dL    Alkaline Phosphatase 50 40 - 150 U/L    AST 17 0 - 35 U/L    ALT 12 0 - 50 U/L    Protein Total 6.9 6.4 - 8.3 g/dL    Albumin 4.3 3.5 - 5.2 g/dL    Bilirubin Total 0.6 <=1.2 mg/dL   CBC with platelets and differential    Collection Time: 04/25/24 10:18 PM   Result Value Ref Range    WBC Count 6.5 4.0 - 11.0  10e3/uL    RBC Count 4.18 3.80 - 5.20 10e6/uL    Hemoglobin 12.6 11.7 - 15.7 g/dL    Hematocrit 38.2 35.0 - 47.0 %    MCV 91 78 - 100 fL    MCH 30.1 26.5 - 33.0 pg    MCHC 33.0 31.5 - 36.5 g/dL    RDW 12.3 10.0 - 15.0 %    Platelet Count 337 150 - 450 10e3/uL    % Neutrophils 54 %    % Lymphocytes 40 %    % Monocytes 6 %    % Eosinophils 0 %    % Basophils 0 %    % Immature Granulocytes 0 %    NRBCs per 100 WBC 0 <1 /100    Absolute Neutrophils 3.5 1.6 - 8.3 10e3/uL    Absolute Lymphocytes 2.6 0.8 - 5.3 10e3/uL    Absolute Monocytes 0.4 0.0 - 1.3 10e3/uL    Absolute Eosinophils 0.0 0.0 - 0.7 10e3/uL    Absolute Basophils 0.0 0.0 - 0.2 10e3/uL    Absolute Immature Granulocytes 0.0 <=0.4 10e3/uL    Absolute NRBCs 0.0 10e3/uL   Vitamin D    Collection Time: 04/26/24 11:30 AM   Result Value Ref Range    Vitamin D, Total (25-Hydroxy) 24 20 - 50 ng/mL   Vitamin B12    Collection Time: 04/26/24 11:30 AM   Result Value Ref Range    Vitamin B12 485 232 - 1,245 pg/mL   TSH with free T4 reflex    Collection Time: 04/26/24 11:30 AM   Result Value Ref Range    TSH 0.87 0.50 - 4.30 uIU/mL            Precautions:     Behavioral Orders   Procedures    Code 1 - Restrict to Unit    Routine Programming     As clinically indicated    Status 15     Every 15 minutes.    Suicide precautions: Suicide Risk: LOW     Patients on Suicide Precautions should have a Combination Diet ordered that includes a Diet selection(s) AND a Behavioral Tray selection for Safe Tray - with utensils, or Safe Tray - NO utensils       Order Specific Question:   Suicide Risk     Answer:   LOW            Psychiatric Examination:                      Weight is 96 lbs 0 oz  Body mass index is 17.56 kg/m .    Appearance: awake, alert and well groomed  Attitude:  cooperative  Eye Contact:  good  Mood:  anxious and depressed  Affect:  mood congruent  Speech:  clear, coherent  Psychomotor Behavior:  no evidence of tardive dyskinesia, dystonia, or tics  Throught Process:   logical and goal oriented  Associations:  no loose associations  Thought Content:  no evidence of suicidal ideation or homicidal ideation  Insight:  fair  Judgement:  fair  Oriented to:  time, person, and place  Attention Span and Concentration:  limited  Recent and Remote Memory:  fair         Precautions:     Behavioral Orders   Procedures    Code 1 - Restrict to Unit    Routine Programming     As clinically indicated    Status 15     Every 15 minutes.    Suicide precautions: Suicide Risk: LOW     Patients on Suicide Precautions should have a Combination Diet ordered that includes a Diet selection(s) AND a Behavioral Tray selection for Safe Tray - with utensils, or Safe Tray - NO utensils       Order Specific Question:   Suicide Risk     Answer:   LOW          DIagnoses:   Major Depressive Disorder, recurrent, severe, without psychosis  Suicidal ideation  Generalized anxiety disorder  Borderline vitamin D deficiency         Plan:   Medications:  Start Pristiq, 25 mg every morning.      --Increase to 50 mg, 4/29/2024    Start vitamin D, 5000 units, once a day.  Level is 24.    Additional medications will include hydroxyzine, Zyprexa, trazodone    Lab work:  Lab work was reviewed and is mostly normal.  U tox was negative.  CBC and CMP were WNL.  --Will order vitamin D, B12, folate, TSH with T4     --All WNL    Consults:   --Care was coordinated with the treatment team.   --The patient was consulted on nature of illness and treatment options.      Disposition Plan   Reason for ongoing admission: poses an imminent risk to self  Discharge location: home with family  Discharge Medications: not ordered  Follow-up Appointments: not scheduled  Legal Status: voluntary   Discharge will be granted once symptoms improved.    Kenisha WIGGINS, CNP    This note was created with the help of Dragon dictation system. All grammatical/typing errors or context distortion are unintentional and inherent to software.

## 2024-04-29 NOTE — PLAN OF CARE
04/28/24 1820   Group Therapy Session   Group Attendance attended group session   Time Session Began 2000   Time Session Ended 2045   Total Time (minutes) 30   Total # Attendees 5   Group Type expressive therapy   Group Topic Covered emotions/expression   Patient Response/Contribution cooperative with task  (pleasant; engaged)     Goal Outcome Evaluation:    Art Therapy directive was to create artwork expressing both low energy and high energy self care activities that pts have tried in the past or would like to try in the future.  Goals of directive: identifying copings skills, emotional regulation, distress tolerance, identifying relaxation skills, identifying personal strengths and goals.  Pt was a quiet, engaged participant, focused on task for the full duration of group. Pt expressed through art and writing several positive self care activities and briefly shared with author and group. Pt was an active participant in group discussion.  Pts mood was calm, pleasant participant.

## 2024-04-29 NOTE — PLAN OF CARE
"   04/29/24 1617   Group Therapy Session   Group Attendance attended group session   Time Session Began 1415   Time Session Ended 1500   Total Time (minutes) 45   Total # Attendees 7   Group Type psychotherapeutic   Group Topic Covered emotions/expression;coping skills/lifestyle management   Group Session Detail Patients took turns answering questions from \"Empowering Questions\" card deck, which generated discussion on discovering the deeper self, exploring dreams for the future, self care, and self-compassion.   Patient Response/Contribution listened actively;cooperative with task;discussed personal experience with topic   Patient Participation Detail Patient was a quiet particpant but did answer questions during her turn.  She demonstrated insight and was respectful of her peers in the group.       "

## 2024-04-30 PROCEDURE — 250N000013 HC RX MED GY IP 250 OP 250 PS 637: Performed by: NURSE PRACTITIONER

## 2024-04-30 PROCEDURE — 90832 PSYTX W PT 30 MINUTES: CPT

## 2024-04-30 PROCEDURE — 99232 SBSQ HOSP IP/OBS MODERATE 35: CPT | Performed by: NURSE PRACTITIONER

## 2024-04-30 PROCEDURE — G0177 OPPS/PHP; TRAIN & EDUC SERV: HCPCS

## 2024-04-30 PROCEDURE — 128N000002 HC R&B CD/MH ADOLESCENT

## 2024-04-30 PROCEDURE — 250N000013 HC RX MED GY IP 250 OP 250 PS 637: Performed by: PSYCHIATRY & NEUROLOGY

## 2024-04-30 RX ORDER — QUETIAPINE FUMARATE 25 MG/1
25 TABLET, FILM COATED ORAL AT BEDTIME
Status: DISCONTINUED | OUTPATIENT
Start: 2024-04-30 | End: 2024-05-01 | Stop reason: HOSPADM

## 2024-04-30 RX ORDER — GABAPENTIN 100 MG/1
100 CAPSULE ORAL 3 TIMES DAILY
Status: DISCONTINUED | OUTPATIENT
Start: 2024-04-30 | End: 2024-05-01

## 2024-04-30 RX ADMIN — GABAPENTIN 100 MG: 100 CAPSULE ORAL at 20:18

## 2024-04-30 RX ADMIN — SENNOSIDES AND DOCUSATE SODIUM 1 TABLET: 8.6; 5 TABLET ORAL at 16:19

## 2024-04-30 RX ADMIN — DESVENLAFAXINE 50 MG: 50 TABLET, FILM COATED, EXTENDED RELEASE ORAL at 08:23

## 2024-04-30 RX ADMIN — QUETIAPINE FUMARATE 25 MG: 25 TABLET ORAL at 20:18

## 2024-04-30 RX ADMIN — Medication 125 MCG: at 08:23

## 2024-04-30 RX ADMIN — GABAPENTIN 100 MG: 100 CAPSULE ORAL at 08:23

## 2024-04-30 RX ADMIN — GABAPENTIN 100 MG: 100 CAPSULE ORAL at 14:17

## 2024-04-30 ASSESSMENT — ACTIVITIES OF DAILY LIVING (ADL)
ADLS_ACUITY_SCORE: 28
HYGIENE/GROOMING: INDEPENDENT
ADLS_ACUITY_SCORE: 28
ADLS_ACUITY_SCORE: 28

## 2024-04-30 NOTE — PLAN OF CARE
Problem: Sleep Disturbance  Goal: Adequate Sleep/Rest  Outcome: Progressing   Goal Outcome Evaluation:  Focus: Shift summary    Data: Patient slept 7 hours last night. No interventions needed. Respirations even and unlabored on status 15 checks. Will continue to monitor and report to oncoming staff.    Response: Report sleep hours to day shift. Continue to monitor patient and provide therapeutic interventions as necessary.

## 2024-04-30 NOTE — PLAN OF CARE
"Individual Therapy Note      Date of Service: April 30, 2024    Patient: Mariana goes by \"Mariana,\" uses she/her pronouns    Individuals Present: Mariana Emilia Varghese [facility]    Session start: 1500  Session end: 1517  Session duration in minutes: 17    Patient Active Problem List   Diagnosis    Inflammation of eyelid    Major depression, recurrent (H24)    Generalized anxiety disorder    Suicide ideation    Suicidal ideation    Major depressive disorder with current active episode, unspecified depression episode severity, unspecified whether recurrent         Modality Used:Person Centered and Rapport Building    Goals: Overcome guilt and lessen depression    Patient Description of current symptoms: Guilt and shame     Mental Status Exam:   Attitude: cooperative  Eye Contact: fair  Mood: sad  and better  Affect: intensity is blunted and intensity is flat  Speech: clear, coherent  Psychomotor Behavior: no evidence of tardive dyskinesia, dystonia, or tics  Thought Process:  logical  Associations: no loose associations  Thought Content: no evidence of suicidal ideation or homicidal ideation  Insight: good  Judgement: intact  Attention Span and Concentration: intact    Pt progress: Patient reported feeling better today than she has in the last few days. She is worried about relationships back home. She doesn't want to talk to anyone about her hospitalization. She has two friends she talks to daily and they knew she was considering PHP, but they don't know she was in the hospital. Pt reports feeling guilty for getting help. Her mom stayed home all week from work to help patient find a program. Patient feels she is inconveniencing everyone around her. She says it is hard to talk to her mom. Patient has a hard time asking for help. She has a hard time saying no to others for fear they will be mad at her. Patient tends to be hard on herself. She is feeling guilty she is not taking classes right now. She decided to take a " "break from school and her parents are upset about it. She feels she is not doing enough and now that she is in the hospital, she is doing nothing. She states that she has a hard time \"letting herself be.\" She has been contemplating suicide the last few years. She feels she doesn't deserve anything she has. She felt nothing was going to get better. She has been sad for a long time and masking it.    Session was interrupted by an important phone call. Writer will meet patient again in two days.    Treatment Objective(s) Addressed:   The focus of this session was on rapport building, orienting the patient to therapy, and processing feelings related to sadness and guilt      Progress Towards Goals and Assessment of Patient:   Patient is making progress towards treatment goals as evidenced by improved mood.       Therapeutic Intervention(s):   Provided active listening, unconditional positive regard, and validation.     Plan/next step: Meet on Thursday to work on self worth and setting boundaries.    84737 - Psychotherapy (with patient) - 30 (16-37*) min                         "

## 2024-04-30 NOTE — PLAN OF CARE
BEH IP Unit Acuity Rating Score (UARS)  Patient is given one point for every criteria they meet.    CRITERIA SCORING   On a 72 hour hold, court hold, committed, stay of commitment, or revocation. 0    Patient LOS on BEH unit exceeds 20 days. 0  LOS: 4   Patient under guardianship, 55+, otherwise medically complex, or under age 11. 0   Suicide ideation without relief of precipitating factors. 1   Current plan for suicide. 0   Current plan for homicide. 0   Imminent risk or actual attempt to seriously harm another without relief of factors precipitating the attempt. 0   Severe dysfunction in daily living (ex: complete neglect for self care, extreme disruption in vegetative function, extreme deterioration in social interactions). 1   Recent (last 7 days) or current physical aggression in the ED or on unit. 0   Restraints or seclusion episode in past 72 hours. 0   Recent (last 7 days) or current verbal aggression, agitation, yelling, etc., while in the ED or unit. 0   Active psychosis. 0   Need for constant or near constant redirection (from leaving, from others, etc).  0   Intrusive or disruptive behaviors. 0   Patient requires 3 or more hours of individualized nursing care per 8-hour shift (i.e. for ADLs, meds, therapeutic interventions). 0   TOTAL 2

## 2024-04-30 NOTE — PLAN OF CARE
[facility]      Anticoagulation Summary  As of 12/10/Twin Cruz MD [facility] [phone]            See the Encounter Report to view Anticoagulation Flowsheet and Dosing Calendar (Go to Encounters tab in chart review, and find the Anticoagulation Therapy Visit)    Patient not here, Protime Results reviewed, Warfarin dosing per protocol, and recommended follow-up appointment made. Paperwork FAXED back to facility.       Meilna Adair, RN                  INITIAL OT ASSESSMENT      04/27/24 2100   General Information   Date Initially Attended OT 04/27/24   Clinical Impression   Affect Flat   Orientation Oriented to person, place and time   Appearance and ADLs General cleanliness observed in most areas   Attention to Internal Stimuli No observed signs   Interaction Skills Interacts appropriately with staff;Interacts appropriately with peers;Guarded   Ability to Communicate Needs Independent   Verbal Content Clear;Appropriate to topic;Superficial   Ability to Maintain Boundaries Maintains appropriate physical boundaries;Maintains appropriate verbal boundaries   Participation Participates with minimal encouragement   Concentration Concentrates 30+ minutes   Ability to Concentrate With structure   Follows and Comprehends Directions Independently follows multi-step directions   Memory Delayed and immediate recall intact   Organization Independently organizes all tasks   Decision Making Independent   Planning and Problem Solving Occasionally needs assist/feedback   Ability to Apply and Learn Concepts Comprehends concepts, but needs assist to apply   Frustrations / Stress Tolerance Independently identifies sources of frustration/stress;Independently identifies skills    Level of Insight Some insight   Self Esteem Accepts positive feedback;Takes risks with support and encouragement;Can identify positives   Social Supports Has knowledge of support systems   BEH OT Care Plan Goals   OT Care Plan coping with symptoms

## 2024-04-30 NOTE — PLAN OF CARE
"  Problem: Suicide Risk  Goal: Absence of Self-Harm  Outcome: Progressing  Intervention: Assess Risk to Self and Maintain Safety  Recent Flowsheet Documentation  Taken 4/30/2024 1436 by Loren Carpio RN  Behavior Management: behavioral plan reviewed  Intervention: Promote Psychosocial Wellbeing  Recent Flowsheet Documentation  Taken 4/30/2024 1436 by Loren Carpio RN  Supportive Measures: active listening utilized  Sleep/Rest Enhancement:   relaxation techniques promoted   regular sleep/rest pattern promoted  Family/Support System Care:   self-care encouraged   involvement promoted   support provided     Problem: Psychotic Symptoms  Goal: Psychotic Symptoms  Description: Signs and symptoms of listed problems will be absent or manageable.  Outcome: Progressing  Flowsheets (Taken 4/30/2024 1444)  Psychotic Symptoms Assessed:   insight   anxiety   orientation   suicidality   thought process   self injury   affect   mood   speech  Psychotic Symptoms Present:   affect   mood   Goal Outcome Evaluation:    Plan of Care Reviewed With: patient    Pt has been more visible in the milieu this shift, guarded but more brighter upon approach. Pt spent some time in the lounge area, social with peers, laughing. Pt reports feeling \"more comfortable in this place now\" referring to the hospital. Pt denies any SI/SIB AH or VH,verbalizes some depression and frustrations over not discharging today. Pt stated that provider told her that she could possibly discharge by Friday \"I feel like staying here till Friday will make things more worse\" pt continued to elaborate that she knows that she needs help and going home without getting the help will not be a good idea. Pt stated that she will be looking into some H&P programs and hopes that she gets accepted soon so she can discharge.  Pt had good intake for both breakfast and lunch with adequate fluids intake. Will continue to monitor                 "

## 2024-04-30 NOTE — PLAN OF CARE
04/29/24     Group Therapy Session   Group Attendance Psychotherapy   Time Session Began 2030   Time Session Ended 2115   Total Time (minutes) 45   Total # Attendees 6   Group Type Psychotherapy   Group Topic Covered Cognitive, affective, and psychomotor processes and how to engage them for mental health improvement.   Group Session Detail Patients reviewed strategies to improve guided thinking, especially when they face difficulties. Reviewed ways catastrophic thoughts may manifest during crisis, as well as ways to counteract them using cognitive and behavioral process that also involve help-seeking. Pt's took turns to discuss their strategies for improving their will power. Identified an unhealthy behavior that they had each said no to, in the past.   Patient Response/Contribution This patient participated in the session and was engaged for the entire time.   Patient Participation Detail Asked questions, received feedback, and provided feedback.

## 2024-04-30 NOTE — PLAN OF CARE
Team Note Due:  Friday    Assessment/Intervention/Current Symtoms and Care Coordination:  Chart review and met with team, discussed pt progress, symptomology, and response to treatment.  Discussed the discharge plan and any potential impediments to discharge.    CTC met with pt. CTC discussed different PHP programs. Pt reports that she had a referral for Day Bridge program with [facility] from her outpatient therapist. Pt reports wanting to attend the PHP that she can get into fastest. Pt signed BREANA for [facility].     CTC left VM for Day Bridge program with [facility] to follow up on referral. CTC reached out to [facility] PHP. There are immediate openings for the PHP.     CTC received p.c from staff at [facility] with [facility]. Staff report that they are ready to screen pt and request an H&P. CTC scheduled a time for them to complete the screening today at 2pm. CTC provided unit phone number. They report that they have pretty immediate openings and will schedule pt when they do the screening.     CTC met with pt. CTC discussed regions calling to do the screening for the day bridge program. Pt and CTC discussed other things such as work letters, other outpatient resources etc.     Discharge Plan or Goal:  Continue stabilization of mental health symptoms and establish a safe discharge plan.      Barriers to Discharge:  Patient requires further psychiatric stabilization due to current symptomology of SI and depression as well as medication management.      Referral Status:  Pt is interested in a PHP, was referred to [facility] at last ED stay. Pt considering [facility] Day Bridge program or [facility]. Pt lives in [place] and reports not liking the drive to the [facility], but pt's grandparents are willing to drive pt as well. Pt would benefit from being scheduled with a psychiatry provider for after the PHP.      Legal Status:  Voluntary     Contacts:  Mother Briana ([phone]) and Dad Urbano - BREANA signed       Upcoming Meetings and Dates/Important Information and next steps:  CTC to meet with pt to discuss programmatic care in more depth.   Pt will need letter for work.

## 2024-04-30 NOTE — PLAN OF CARE
Problem: Suicide Risk  Goal: Absence of Self-Harm  Outcome: Progressing   Goal Outcome Evaluation:    Plan of Care Reviewed With: patient      Pt presents as anxious, withdrawn. She spends time walking in the hallway. She is present in the milieu at times and attended evening group. She reports feeling depressed and anxious. She reports suicidal thoughts get worse before bed but she currently feels that she can keep herself safe. She denies thoughts to harm others or hallucinations. She was given PRN Hydroxyzine at bedtime to help with heightened anxiety and sleep. She reports possible interest in Trazodone before she discharges as she reports trouble falling asleep, though declined medication tonight. She reports taking Seroquel for sleep in the past but feels that it was not helpful. No other safety or behavioral concerns this shift.     No scheduled evening medications. She reports constipation, no BM for 4 days, declined interventions at first but later was accepting of PRN Senna. She is eating and drinking. No other medical concerns at this time.

## 2024-04-30 NOTE — PLAN OF CARE
04/30/24 1530   Group Therapy Session   Group Attendance attended group session   Time Session Began 1315   Time Session Ended 1415   Total Time (minutes) 55   Total # Attendees 9   Group Type community;life skill;recreation   Patient Participation Detail OT: Cognitive activity via leisure task for attention, sequencing, communication, new learning, retention, reality-orientation, social engagement, leisure exploration and coping with symptoms.  Pt Response: Pt presented with neutral, calm mood. Pt able to track the activity. Good language capability noted using memory and naming for creative responses. Pt appeared to enjoy the abstract leisure task, evidenced by brightened affect and socialization with peers.

## 2024-04-30 NOTE — PROGRESS NOTES
"[facility], [place]   Psychiatric Progress Note        Interim History:   From H&P: Mariana Blake is a 18 year old female who presents with suicidal ideation over the past 2 weeks. She was seen in the ED at [facility] on 4/12, where she had been experiencing similar symptoms. After her discharge, she stopped taking her Effexor altogether, and has not been on any medications since. Regarding her suicidal thoughts, she does endorse various plans, but no intent to act on them. She lives at home with her parents, who do own guns that are locked away. She has been dealing with anxiety and depression for the past 4 years, and is currently seeing a therapist. No previous suicide attempts, recent acts of self-harm, homicidal ideation, hallucinations, cough, fever, or sore throat. No alcohol, tobacco, or illicit drug/marijuana use.     Team meeting report: The patient's care was discussed with the treatment team during the daily team meeting and/or staff's chart notes were reviewed.  Staff reports the patient continues to be depressed and anxious.  She is withdrawn to her room.  She is visible in the milieu at times.  Attended few groups.  Med compliant.  Slept about 7 hours.  No behavioral issues.    Met with patient.  Continues to be very depressed.  Suicidal thoughts are subsiding.  Continues to feel very anxious.  She is ruminating \"about everything\".  She did not sleep well last night.  She kept waking and when she was asleep, it was a very light and sleep.  She feels tired.  She is feeling guilty about leaving her job.  Discussed the nature of the illness and how it is similar to any other medical issues.  The patient talked about not wanting to do anything or go anywhere.  She knows she needs to stay in the hospital to stabilize but at the same time feels highly uncomfortable and wants to go home.  She knows she is not going to do well going home without feeling little bit better.  " Discussed disposition.  The patient was agreeable to stay here until Friday.  Discussed the medication changes.  She would like to have Seroquel for sleep at bedtime which she has had in the past.  She knows that she is on gabapentin and she takes it for anxiety.  No other questions or concerns.  Encourage patient to go to as many groups as possible.         Medications:     Current Facility-Administered Medications   Medication Dose Route Frequency Provider Last Rate Last Admin    cholecalciferol (VITAMIN D3) capsule 125 mcg  125 mcg Oral Daily GabriellaevaKenisha, APRN CNP   125 mcg at 04/30/24 0823    desvenlafaxine (PRISTIQ) 24 hr tablet 50 mg  50 mg Oral Daily Miltcheva, Aixaliya Franceseva, APRN CNP   50 mg at 04/30/24 0823    gabapentin (NEURONTIN) capsule 100 mg  100 mg Oral TID Miltcheva, Kenisha Danielseva, APRN CNP   100 mg at 04/30/24 1417            Allergies:   No Known Allergies         Labs:     Recent Results (from the past 672 hour(s))   Comprehensive metabolic panel    Collection Time: 04/12/24  5:35 PM   Result Value Ref Range    Sodium 138 135 - 145 mmol/L    Potassium 3.8 3.4 - 5.3 mmol/L    Carbon Dioxide (CO2) 27 22 - 29 mmol/L    Anion Gap 10 7 - 15 mmol/L    Urea Nitrogen 6.5 6.0 - 20.0 mg/dL    Creatinine 0.55 0.51 - 0.95 mg/dL    GFR Estimate >90 >60 mL/min/1.73m2    Calcium 9.4 8.6 - 10.0 mg/dL    Chloride 101 98 - 107 mmol/L    Glucose 105 (H) 70 - 99 mg/dL    Alkaline Phosphatase 58 40 - 150 U/L    AST 22 0 - 35 U/L    ALT 16 0 - 50 U/L    Protein Total 7.7 6.4 - 8.3 g/dL    Albumin 4.7 3.5 - 5.2 g/dL    Bilirubin Total 0.3 <=1.2 mg/dL   Lipase    Collection Time: 04/12/24  5:35 PM   Result Value Ref Range    Lipase 22 13 - 60 U/L   Magnesium    Collection Time: 04/12/24  5:35 PM   Result Value Ref Range    Magnesium 2.3 1.7 - 2.3 mg/dL   Phosphorus    Collection Time: 04/12/24  5:35 PM   Result Value Ref Range    Phosphorus 3.6 2.5 - 4.5 mg/dL   HCG qualitative pregnancy (blood)     Collection Time: 04/12/24  5:35 PM   Result Value Ref Range    hCG Serum Qualitative Negative Negative   Salicylate level    Collection Time: 04/12/24  5:35 PM   Result Value Ref Range    Salicylate <0.3   mg/dL   Acetaminophen level    Collection Time: 04/12/24  5:35 PM   Result Value Ref Range    Acetaminophen <5.0 (L) 10.0 - 30.0 ug/mL   Alcohol level blood    Collection Time: 04/12/24  5:35 PM   Result Value Ref Range    Alcohol ethyl <0.01 <=0.01 g/dL   CBC with platelets and differential    Collection Time: 04/12/24  5:35 PM   Result Value Ref Range    WBC Count 5.6 4.0 - 11.0 10e3/uL    RBC Count 4.66 3.80 - 5.20 10e6/uL    Hemoglobin 14.2 11.7 - 15.7 g/dL    Hematocrit 42.0 35.0 - 47.0 %    MCV 90 78 - 100 fL    MCH 30.5 26.5 - 33.0 pg    MCHC 33.8 31.5 - 36.5 g/dL    RDW 12.4 10.0 - 15.0 %    Platelet Count 335 150 - 450 10e3/uL    % Neutrophils 66 %    % Lymphocytes 23 %    % Monocytes 8 %    % Eosinophils 2 %    % Basophils 0 %    % Immature Granulocytes 0 %    NRBCs per 100 WBC 0 <1 /100    Absolute Neutrophils 3.7 1.6 - 8.3 10e3/uL    Absolute Lymphocytes 1.3 0.8 - 5.3 10e3/uL    Absolute Monocytes 0.5 0.0 - 1.3 10e3/uL    Absolute Eosinophils 0.1 0.0 - 0.7 10e3/uL    Absolute Basophils 0.0 0.0 - 0.2 10e3/uL    Absolute Immature Granulocytes 0.0 <=0.4 10e3/uL    Absolute NRBCs 0.0 10e3/uL   UA with Microscopic reflex to Culture    Collection Time: 04/12/24  7:40 PM    Specimen: Urine, Clean Catch   Result Value Ref Range    Color Urine Yellow Colorless, Straw, Light Yellow, Yellow    Appearance Urine Cloudy (A) Clear    Glucose Urine Negative Negative mg/dL    Bilirubin Urine Negative Negative    Ketones Urine Negative Negative mg/dL    Specific Gravity Urine 1.018 1.003 - 1.035    Blood Urine Negative Negative    pH Urine 7.0 5.0 - 7.0    Protein Albumin Urine Negative Negative mg/dL    Urobilinogen Urine 2.0 Normal, 2.0 mg/dL    Nitrite Urine Negative Negative    Leukocyte Esterase Urine Negative  Negative    Bacteria Urine Few (A) None Seen /HPF    Mucus Urine Present (A) None Seen /LPF    Amorphous Crystals Urine Few (A) None Seen /HPF    RBC Urine 0 <=2 /HPF    WBC Urine 4 <=5 /HPF    Squamous Epithelials Urine 9 (H) <=1 /HPF   Urine Drug Screen Panel    Collection Time: 04/12/24  7:40 PM   Result Value Ref Range    Amphetamines Urine Screen Negative Screen Negative    Barbituates Urine Screen Negative Screen Negative    Benzodiazepine Urine Screen Negative Screen Negative    Cannabinoids Urine Screen Negative Screen Negative    Cocaine Urine Screen Negative Screen Negative    Fentanyl Qual Urine Screen Negative Screen Negative    Opiates Urine Screen Negative Screen Negative    PCP Urine Screen Negative Screen Negative   HCG qualitative urine    Collection Time: 04/25/24  9:38 PM   Result Value Ref Range    hCG Urine Qualitative Negative Negative   Urine Drug Screen Panel    Collection Time: 04/25/24  9:38 PM   Result Value Ref Range    Amphetamines Urine Screen Negative Screen Negative    Barbituates Urine Screen Negative Screen Negative    Benzodiazepine Urine Screen Negative Screen Negative    Cannabinoids Urine Screen Negative Screen Negative    Cocaine Urine Screen Negative Screen Negative    Fentanyl Qual Urine Screen Negative Screen Negative    Opiates Urine Screen Negative Screen Negative    PCP Urine Screen Negative Screen Negative   Comprehensive metabolic panel    Collection Time: 04/25/24 10:18 PM   Result Value Ref Range    Sodium 140 135 - 145 mmol/L    Potassium 4.3 3.4 - 5.3 mmol/L    Carbon Dioxide (CO2) 26 22 - 29 mmol/L    Anion Gap 10 7 - 15 mmol/L    Urea Nitrogen 7.7 6.0 - 20.0 mg/dL    Creatinine 0.57 0.51 - 0.95 mg/dL    GFR Estimate >90 >60 mL/min/1.73m2    Calcium 9.4 8.6 - 10.0 mg/dL    Chloride 104 98 - 107 mmol/L    Glucose 93 70 - 99 mg/dL    Alkaline Phosphatase 50 40 - 150 U/L    AST 17 0 - 35 U/L    ALT 12 0 - 50 U/L    Protein Total 6.9 6.4 - 8.3 g/dL    Albumin 4.3 3.5 -  5.2 g/dL    Bilirubin Total 0.6 <=1.2 mg/dL   CBC with platelets and differential    Collection Time: 04/25/24 10:18 PM   Result Value Ref Range    WBC Count 6.5 4.0 - 11.0 10e3/uL    RBC Count 4.18 3.80 - 5.20 10e6/uL    Hemoglobin 12.6 11.7 - 15.7 g/dL    Hematocrit 38.2 35.0 - 47.0 %    MCV 91 78 - 100 fL    MCH 30.1 26.5 - 33.0 pg    MCHC 33.0 31.5 - 36.5 g/dL    RDW 12.3 10.0 - 15.0 %    Platelet Count 337 150 - 450 10e3/uL    % Neutrophils 54 %    % Lymphocytes 40 %    % Monocytes 6 %    % Eosinophils 0 %    % Basophils 0 %    % Immature Granulocytes 0 %    NRBCs per 100 WBC 0 <1 /100    Absolute Neutrophils 3.5 1.6 - 8.3 10e3/uL    Absolute Lymphocytes 2.6 0.8 - 5.3 10e3/uL    Absolute Monocytes 0.4 0.0 - 1.3 10e3/uL    Absolute Eosinophils 0.0 0.0 - 0.7 10e3/uL    Absolute Basophils 0.0 0.0 - 0.2 10e3/uL    Absolute Immature Granulocytes 0.0 <=0.4 10e3/uL    Absolute NRBCs 0.0 10e3/uL   Vitamin D    Collection Time: 04/26/24 11:30 AM   Result Value Ref Range    Vitamin D, Total (25-Hydroxy) 24 20 - 50 ng/mL   Vitamin B12    Collection Time: 04/26/24 11:30 AM   Result Value Ref Range    Vitamin B12 485 232 - 1,245 pg/mL   TSH with free T4 reflex    Collection Time: 04/26/24 11:30 AM   Result Value Ref Range    TSH 0.87 0.50 - 4.30 uIU/mL            Precautions:     Behavioral Orders   Procedures    Code 1 - Restrict to Unit    Routine Programming     As clinically indicated    Status 15     Every 15 minutes.    Suicide precautions: Suicide Risk: LOW     Patients on Suicide Precautions should have a Combination Diet ordered that includes a Diet selection(s) AND a Behavioral Tray selection for Safe Tray - with utensils, or Safe Tray - NO utensils       Order Specific Question:   Suicide Risk     Answer:   LOW            Psychiatric Examination:   Temp: 97.4  F (36.3  C) Temp src: Temporal BP: 113/74 Pulse: 53     SpO2: 100 % O2 Device: None (Room air)    Weight is 96 lbs 0 oz  Body mass index is 17.56  kg/m .    Appearance: awake, alert and well groomed  Attitude:  cooperative  Eye Contact:  good  Mood:  anxious and depressed  Affect:  mood congruent  Speech:  clear, coherent  Psychomotor Behavior:  no evidence of tardive dyskinesia, dystonia, or tics  Throught Process:  logical and goal oriented  Associations:  no loose associations  Thought Content:  no evidence of suicidal ideation or homicidal ideation  Insight:  fair  Judgement:  fair  Oriented to:  time, person, and place  Attention Span and Concentration:  limited  Recent and Remote Memory:  fair         Precautions:     Behavioral Orders   Procedures    Code 1 - Restrict to Unit    Routine Programming     As clinically indicated    Status 15     Every 15 minutes.    Suicide precautions: Suicide Risk: LOW     Patients on Suicide Precautions should have a Combination Diet ordered that includes a Diet selection(s) AND a Behavioral Tray selection for Safe Tray - with utensils, or Safe Tray - NO utensils       Order Specific Question:   Suicide Risk     Answer:   LOW          DIagnoses:   Major Depressive Disorder, recurrent, severe, without psychosis  Suicidal ideation  Generalized anxiety disorder  Borderline vitamin D deficiency         Plan:   Medications:  Start Pristiq, 25 mg every morning.      --Increase to 50 mg, 4/29/2024    Start vitamin D, 5000 units, once a day.  Level is 24.  -- Start Seroquel, 25 mg at bedtime for sleep and mood stabilization  -- Start gabapentin, 100 mg 3 times a day for anxiety    Additional medications will include hydroxyzine, Zyprexa, trazodone    Lab work:  Lab work was reviewed and is mostly normal.  U tox was negative.  CBC and CMP were WNL.  --Will order vitamin D, B12, folate, TSH with T4     --All WNL    Consults:   --Care was coordinated with the treatment team.   --The patient was consulted on nature of illness and treatment options.      Disposition Plan   Reason for ongoing admission: poses an imminent risk to  self  Discharge location: home with family  Discharge Medications: not ordered  Follow-up Appointments: not scheduled  Legal Status: voluntary   Discharge will be granted once symptoms improved.    Kenisha WIGGINS, CNP    This note was created with the help of Dragon dictation system. All grammatical/typing errors or context distortion are unintentional and inherent to software.

## 2024-05-01 VITALS
OXYGEN SATURATION: 99 % | SYSTOLIC BLOOD PRESSURE: 115 MMHG | TEMPERATURE: 98.2 F | RESPIRATION RATE: 16 BRPM | HEIGHT: 62 IN | WEIGHT: 96 LBS | BODY MASS INDEX: 17.66 KG/M2 | DIASTOLIC BLOOD PRESSURE: 80 MMHG | HEART RATE: 81 BPM

## 2024-05-01 PROCEDURE — 99238 HOSP IP/OBS DSCHRG MGMT 30/<: CPT | Performed by: NURSE PRACTITIONER

## 2024-05-01 PROCEDURE — 250N000013 HC RX MED GY IP 250 OP 250 PS 637: Performed by: NURSE PRACTITIONER

## 2024-05-01 PROCEDURE — G0177 OPPS/PHP; TRAIN & EDUC SERV: HCPCS

## 2024-05-01 RX ORDER — DESVENLAFAXINE 50 MG/1
50 TABLET, FILM COATED, EXTENDED RELEASE ORAL DAILY
Qty: 30 TABLET | Refills: 0 | Status: SHIPPED | OUTPATIENT
Start: 2024-05-02

## 2024-05-01 RX ORDER — QUETIAPINE FUMARATE 25 MG/1
25 TABLET, FILM COATED ORAL AT BEDTIME
Qty: 30 TABLET | Refills: 0 | Status: SHIPPED | OUTPATIENT
Start: 2024-05-01

## 2024-05-01 RX ORDER — GABAPENTIN 300 MG/1
300 CAPSULE ORAL 3 TIMES DAILY
Status: DISCONTINUED | OUTPATIENT
Start: 2024-05-01 | End: 2024-05-01 | Stop reason: HOSPADM

## 2024-05-01 RX ORDER — AMOXICILLIN 250 MG
1 CAPSULE ORAL 2 TIMES DAILY PRN
Qty: 30 TABLET | Refills: 0 | Status: SHIPPED | OUTPATIENT
Start: 2024-05-01

## 2024-05-01 RX ORDER — GABAPENTIN 300 MG/1
300 CAPSULE ORAL 3 TIMES DAILY
Qty: 90 CAPSULE | Refills: 0 | Status: SHIPPED | OUTPATIENT
Start: 2024-05-01

## 2024-05-01 RX ADMIN — GABAPENTIN 300 MG: 300 CAPSULE ORAL at 13:00

## 2024-05-01 RX ADMIN — GABAPENTIN 100 MG: 100 CAPSULE ORAL at 07:47

## 2024-05-01 RX ADMIN — DESVENLAFAXINE 50 MG: 50 TABLET, FILM COATED, EXTENDED RELEASE ORAL at 07:46

## 2024-05-01 RX ADMIN — Medication 125 MCG: at 07:46

## 2024-05-01 ASSESSMENT — ACTIVITIES OF DAILY LIVING (ADL)
ADLS_ACUITY_SCORE: 28

## 2024-05-01 NOTE — PLAN OF CARE
"   05/01/24 1104   Group Therapy Session   Group Attendance attended group session   Time Session Began 1015   Time Session Ended 1100   Total Time (minutes) 20   Total # Attendees 7   Group Type life skill;psychoeducation   Patient Participation Detail Mental Health Management group with a focus on self-esteem and healthy routine building. Pts were instructed through a multi-step project of creating a positive affirmations monthly calendar. Purpose of the activity: Establishing healthy habits and routine, reality orientation, to promote sense of hope, coping and observation of follow through with a multi-step task. Pt Response: Pt arrived to group late but still answered the check in question stating their favorite self-esteem building activity/practice \"getting ready for the day and puttting on makeup\". Pt engaged in both discussive and task portions of group. Pt able to follow multi-step verbal instruction and remained IND with all task demands.         "

## 2024-05-01 NOTE — PLAN OF CARE
Goal Outcome Evaluation:  Problem: Adult Behavioral Health Plan of Care  Goal: Plan of Care Review  Outcome: Progressing     Up and visible this morning.     Full range.  Social.    Asking about possible discharge.  This writer reviewed the process.  Patient currently denies SISIB or thoughts to hurt others.  Does continue to endorse depression and anxiety which she rates both as 5--but feels they are manageable.  Thinking is linear and organized.    1038:  will be discharged today.  Meds to be sent to her outpatient pharmacy.    1245: reviewed discharge instructions with patient and she acknowledged understanding.  1300: discharged with all belongings accompanied by her father.

## 2024-05-01 NOTE — PLAN OF CARE
BEH IP Unit Acuity Rating Score (UARS)  Patient is given one point for every criteria they meet.    CRITERIA SCORING   On a 72 hour hold, court hold, committed, stay of commitment, or revocation. 0    Patient LOS on BEH unit exceeds 20 days. 0  LOS: 5   Patient under guardianship, 55+, otherwise medically complex, or under age 11. 0   Suicide ideation without relief of precipitating factors. 1   Current plan for suicide. 0   Current plan for homicide. 0   Imminent risk or actual attempt to seriously harm another without relief of factors precipitating the attempt. 0   Severe dysfunction in daily living (ex: complete neglect for self care, extreme disruption in vegetative function, extreme deterioration in social interactions). 1   Recent (last 7 days) or current physical aggression in the ED or on unit. 0   Restraints or seclusion episode in past 72 hours. 0   Recent (last 7 days) or current verbal aggression, agitation, yelling, etc., while in the ED or unit. 0   Active psychosis. 0   Need for constant or near constant redirection (from leaving, from others, etc).  0   Intrusive or disruptive behaviors. 0   Patient requires 3 or more hours of individualized nursing care per 8-hour shift (i.e. for ADLs, meds, therapeutic interventions). 0   TOTAL 2

## 2024-05-01 NOTE — PLAN OF CARE
"  Problem: Suicide Risk  Goal: Absence of Self-Harm  Outcome: Progressing   Goal Outcome Evaluation:                  Patient was visible in the lounge this shift. Social with select peers, observe pacing the velasquez and talking to peer. Patient reported mood was \"relaxed\". Denied any anxiety, depression, SI,SIB,HI, AVH. Patient will like to discharge tomorrow. Patient stated she is doing much better and feels ready to discharge. Pt will like to enroll in a PHP program by Friday. Patient also gave writer a list of her coping skills and safety plan when she discharges. Placed in chart. Patient was encouraged to wait and speak with provider about discharge plans in the morning.    Complained of constipation. Pt stated she hasn't had bowel movement since admission. Patient stated she feels anxious about having a BM anywhere else other than her home. Patient stated she feels when she discharges she will have a BM. PRN senna S given, fluids encouraged. Declined prune juice or any other laxatives at this time. Ate 50% of dinner. Vitals stable.    Family visited and brought over some lab work which patient stated provider requested. Placed in front of chart. Patient was compliant with taking scheduled medications at bedtime. No behavior or safety concerns at this time. Will continue to monitor and provide support and redirection as needed.    "

## 2024-05-01 NOTE — PLAN OF CARE
Problem: Sleep Disturbance  Goal: Adequate Sleep/Rest  Outcome: Adequate for Care Transition   Goal Outcome Evaluation:    The patient appeared asleep throughout the night without safety, behavioral, or medical concerns. She slept for about 7 hours.

## 2024-05-01 NOTE — PLAN OF CARE
05/01/24 1519   Group Therapy Session   Group Attendance attended group session   Time Session Began 1115   Time Session Ended 1200   Total Time (minutes) 45   Total # Attendees 5   Group Type life skill;task skill   Group Session Detail OT CLINIC   Patient Response/Contribution cooperative with task;organized   Patient Participation Detail  --

## 2024-05-01 NOTE — DISCHARGE SUMMARY
"Psychiatric Discharge Summary    Mariana Blake MRN# [id number]   Age: 18 year old YOB: 2005     Date of Admission:  4/26/2024  Date of Discharge:  5/1/2024  Admitting Physician:  Suhas Ralph MD  Discharge Physician:  ROSALIE Roberts CNP          Event Leading to Hospitalization:   From ED: Mariana Blake is a 18 year old female who presents with suicidal ideation over the past 2 weeks. She was seen in the ED at [facility] on 4/12, where she had been experiencing similar symptoms. After her discharge, she stopped taking her Effexor altogether, and has not been on any medications since. Regarding her suicidal thoughts, she does endorse various plans, but no intent to act on them. She lives at home with her parents, who do own guns that are locked away. She has been dealing with anxiety and depression for the past 4 years, and is currently seeing a therapist. No previous suicide attempts, recent acts of self-harm, homicidal ideation, hallucinations, cough, fever, or sore throat. No alcohol, tobacco, or illicit drug/marijuana use.     Mariana Blake is a 18 year old female with history of depression, anxiety, and suicidal ideation.  The patient is a good historian.  States the reason for admission is severe depression.  She has been having suicidal thoughts.  States that she was seen in the emergency room 2 weeks ago and was released with recommendations to attend the [facility] program.  Unfortunately, they were not able to set it up and the patient continued to get more depressed.  She also stopped taking her Effexor.  She has been on Effexor for about 2 to 3 months now, 75 mg every day.  She was taking it as prescribed prior to stopping it cold turkey about 2 weeks ago.  States the medication was never helpful.  In the last month, the patient has been severely depressed.  That she is sleeping \"a lot\", some days sleeping all day and all night.  Energy is very low.  Denies problems with " "memory and concentration.  That she has not been eating.  She lost weight.  Continues to have suicidal thoughts but feels safe on the unit.  The patient feels hopeless, helpless, and worthless.  Anxiety is a daily occurrence.  She has been prescribed Seroquel in the past but the medication have not been helpful, \"just makes me tired\".  The patient reports panic attacks with the last occurrence about a month ago during which she is experiencing shortness of breath.  The panic attacks appear mainly at night.  Denies history of juno or psychosis including auditory visual hallucinations, paranoia, delusions.  Denies PTSD and OCD.  Denies eating disorders however, states that she started restricting about 2 weeks ago.  Denies borderline personality disorder.  She has never attempted suicide.  She has a history of cutting on her thighs.  She cuts herself \"instead of attempting suicide\".Denies seizures, head injuries, and loss of consciousness.    Spoke with patients mother Briana Blake, [phone].  Discussed the plan of care.  Requested to bring the genetic testing the patient had about a year ago.        See Admission note by ROSALIE Roberts CNP for additional details.          Diagnoses:     Major Depressive Disorder, recurrent, severe, without psychosis  Suicidal ideation  Generalized anxiety disorder  Borderline vitamin D deficiency         Labs:        Lab Results   Component Value Date     04/25/2024    Lab Results   Component Value Date    CHLORIDE 104 04/25/2024    Lab Results   Component Value Date    BUN 7.7 04/25/2024      Lab Results   Component Value Date    POTASSIUM 4.3 04/25/2024    Lab Results   Component Value Date    CO2 26 04/25/2024    Lab Results   Component Value Date    CR 0.57 04/25/2024          Lab Results   Component Value Date    WBC 6.5 04/25/2024    HGB 12.6 04/25/2024    HCT 38.2 04/25/2024    MCV 91 04/25/2024     04/25/2024     Lab Results   Component Value " Date    AST 17 04/25/2024    ALT 12 04/25/2024    ALKPHOS 50 04/25/2024    BILITOTAL 0.6 04/25/2024     Lab Results   Component Value Date    TSH 0.87 04/26/2024            Consults:   No consultations were requested during this admission         Hospital Course:   Mariana Blake was admitted to Station 6[facility] with attending Kenisha WIGGINS CNP, as a voluntary patient. The patient was placed under status 15 (15 minute checks) to ensure patient safety.     The patient tolerated medications well. Reported mood symptoms improved.  Pristiq was started then increase it to the 50 mg every morning.  Gabapentin was started and increased to 300 mg 3 times a day. Seroquel was started 25 mg at bedtime for sleep.  Initially, the patient was isolated to her room but slowly started to get out by the end of it attended most groups.  By the end of the hospitalization, the patient was active on the unit. The patient was social, engaged and attended groups. No overt juno, confusion or psychosis noted. The patient maintained denial of SI, HI and ESTEPHANIE. The patient reported improvement in both depression and anxiety.  Future oriented, feeling hopeful for the future. The patient slept well. Appetite was intact. The patient was compliant with medications and care.     Mariana Blake was released to home. At the time of this encounter, Mariana Blake was determined to not be a danger to herself or others and symptoms did not meet criteria for involuntary hospitalization.      Safety plan, post discharge recommendations and relapse prevention were discussed with the patient. The patient agreed to call 911 or present to ED if symptoms worsen or developed thoughts of suicide, self harm or homicide.  The patient agreed to continue medications and outpatient care.         Discharge Medications:     Current Discharge Medication List        START taking these medications    Details   cholecalciferol (VITAMIN D3) 125 mcg (5000  units) capsule Take 1 capsule (125 mcg) by mouth daily  Qty: 30 capsule, Refills: 0    Associated Diagnoses: Major depressive disorder with current active episode, unspecified depression episode severity, unspecified whether recurrent      desvenlafaxine (PRISTIQ) 50 MG 24 hr tablet Take 1 tablet (50 mg) by mouth daily  Qty: 30 tablet, Refills: 0    Associated Diagnoses: Major depressive disorder with current active episode, unspecified depression episode severity, unspecified whether recurrent; Generalized anxiety disorder      gabapentin (NEURONTIN) 300 MG capsule Take 1 capsule (300 mg) by mouth 3 times daily  Qty: 90 capsule, Refills: 0    Associated Diagnoses: Major depressive disorder with current active episode, unspecified depression episode severity, unspecified whether recurrent; Generalized anxiety disorder      senna-docusate (SENOKOT-S/PERICOLACE) 8.6-50 MG tablet Take 1 tablet by mouth 2 times daily as needed for constipation  Qty: 30 tablet, Refills: 0    Associated Diagnoses: Chronic idiopathic constipation           CONTINUE these medications which have CHANGED    Details   QUEtiapine (SEROQUEL) 25 MG tablet Take 1 tablet (25 mg) by mouth at bedtime  Qty: 30 tablet, Refills: 0    Associated Diagnoses: Major depressive disorder with current active episode, unspecified depression episode severity, unspecified whether recurrent; Generalized anxiety disorder           STOP taking these medications       venlafaxine (EFFEXOR XR) 75 MG 24 hr capsule Comments:   Reason for Stopping:                    Psychiatric Examination:   Appearance:  awake, alert and neatly groomed  Attitude:  cooperative  Eye Contact:  good  Mood:  anxious, depressed, and better  Affect:  appropriate and in normal range  Speech:  clear, coherent  Psychomotor Behavior:  no evidence of tardive dyskinesia, dystonia, or tics  Thought Process:  logical and goal oriented  Associations:  no loose associations  Thought Content:  no  evidence of suicidal ideation or homicidal ideation  Insight:  good  Judgment:  intact  Oriented to:  time, person, and place  Attention Span and Concentration:  intact  Recent and Remote Memory:  intact  Language: Able to name objects  Fund of Knowledge: appropriate  Muscle Strength and Tone: normal  Gait and Station: Normal         Discharge Plan:   The following medication changes took place:   Start Pristiq, 25 mg every morning.      --Increase to 50 mg, 4/29/2024    Start vitamin D, 5000 units, once a day.  Level is 24.  -- Start Seroquel, 25 mg at bedtime for sleep and mood stabilization  -- Start gabapentin, 100 mg 3 times a day for anxiety.      --increased to 300 mg, on the day of discharge.     Follow up with your outpatient provider/team.  Per Psychiatric note:  CTC left  for [facility] program with [facility] to follow up on referral. Psychiatric reached out to [facility] PHP. There are immediate openings for the PHP.    Psychiatric received p.c from staff at [facility] with [facility]. Staff report that they are ready to screen pt and request an H&P. Psychiatric scheduled a time for them to complete the screening today at 2pm. CTC provided unit phone number. They report that they have pretty immediate openings and will schedule pt when they do the screening.   Pt is interested in a PHP, was referred to [facility] at last ED stay. Pt considering [facility] program or [facility]. Pt lives in [place] and reports not liking the drive to the [facility], but pt's grandparents are willing to drive pt as well. Pt would benefit from being scheduled with a psychiatry provider for after the PHP.     Bhargavi Woods, LGSW     Attestation:  The patient has been seen and evaluated by me,  Kenisha WIGGINS, CNP

## 2024-05-01 NOTE — PLAN OF CARE
Team Note Due:  Friday    Assessment/Intervention/Current Symtoms and Care Coordination:  Chart review and met with team, discussed pt progress, symptomology, and response to treatment.  Discussed the discharge plan and any potential impediments to discharge.    CTC received VM from Ivett at [facility], pt can start the virtual intake tomorrow 5/2 and can start the program in person on 5/3.     CTC was notified by pt and provider that pt is discharging today at 1pm.     CTC met with pt. [facility]. CTC discussed psychiatry appointment following PHP. Pt was agreeable to this and had a preferred psychiatry provider through [facility].     CTC spoke with Ivett with [facility] and discussed the intake for pt. Pt will receive instructions for the first day tomorrow during the virtual intake.     [facility] met with pt and discussed outpatient programs/appointments. Pt reports that her mom can drive her to the PHP on Friday and then next week she might have her grandparents drive her. [facility] provided pt with work excuse letter.     Discharge Plan or Goal:  Dispo Plan: Home with family with intake for PHP on 5/2.  Transportation: Family to pick pt up at 1pm.   Provisional discharge required: No   AVS complete: Yes    Barriers to Discharge:  None     Referral Status:  Pt scheduled with intake for PHP on 5/2 with [facility].   Pt scheduled with psychiatry provider following PHP. Pt has existing therapist she will connect with following the PHP.      Legal Status:  Voluntary     Contacts:  Mother Briana ([phone]) and Dad Urbano TOUSSAINT signed      Upcoming Meetings and Dates/Important Information and next steps:

## 2024-05-02 ENCOUNTER — PATIENT OUTREACH (OUTPATIENT)
Dept: CARE COORDINATION | Facility: CLINIC | Age: 19
End: 2024-05-02
Payer: COMMERCIAL

## 2024-05-02 DIAGNOSIS — Z09 HOSPITAL DISCHARGE FOLLOW-UP: ICD-10-CM

## 2024-05-02 NOTE — PROGRESS NOTES
"Clinic Care Coordination Contact  [facility]: Post-Discharge Note  SITUATION                                                      Admission:    Admission Date: 04/26/24   Reason for Admission: Suicidal Ideation  Discharge:   Discharge Date: 05/01/24  Discharge Diagnosis: Suicidal Ideation    BACKGROUND                                                      From ED: Mariana Blake is a 18 year old female who presents with suicidal ideation over the past 2 weeks. She was seen in the ED at [facility] on 4/12, where she had been experiencing similar symptoms. After her discharge, she stopped taking her Effexor altogether, and has not been on any medications since. Regarding her suicidal thoughts, she does endorse various plans, but no intent to act on them. She lives at home with her parents, who do own guns that are locked away. She has been dealing with anxiety and depression for the past 4 years, and is currently seeing a therapist. No previous suicide attempts, recent acts of self-harm, homicidal ideation, hallucinations, cough, fever, or sore throat. No alcohol, tobacco, or illicit drug/marijuana use.      Mariana Blake is a 18 year old female with history of depression, anxiety, and suicidal ideation.  The patient is a good historian.  States the reason for admission is severe depression.  She has been having suicidal thoughts.  States that she was seen in the emergency room 2 weeks ago and was released with recommendations to attend the [facility] program.  Unfortunately, they were not able to set it up and the patient continued to get more depressed.  She also stopped taking her Effexor.  She has been on Effexor for about 2 to 3 months now, 75 mg every day.  She was taking it as prescribed prior to stopping it cold turkey about 2 weeks ago.  States the medication was never helpful.  In the last month, the patient has been severely depressed.  That she is sleeping \"a lot\", some days sleeping all day and " "all night.  Energy is very low.  Denies problems with memory and concentration.  That she has not been eating.  She lost weight.  Continues to have suicidal thoughts but feels safe on the unit.  The patient feels hopeless, helpless, and worthless.  Anxiety is a daily occurrence.  She has been prescribed Seroquel in the past but the medication have not been helpful, \"just makes me tired\".  The patient reports panic attacks with the last occurrence about a month ago during which she is experiencing shortness of breath.  The panic attacks appear mainly at night.  Denies history of juno or psychosis including auditory visual hallucinations, paranoia, delusions.  Denies PTSD and OCD.  Denies eating disorders however, states that she started restricting about 2 weeks ago.  Denies borderline personality disorder.  She has never attempted suicide.  She has a history of cutting on her thighs.  She cuts herself \"instead of attempting suicide\".Denies seizures, head injuries, and loss of consciousness.       ASSESSMENT           Discharge Assessment  How are you doing now that you are home?: Patient shares that she is doing well. Will start PHP tomorrow and is nervous. Writer validates this feeling and let's her know she will do great and it will feel better once she get's past the first session. Patient shares that she is taking medications and her parents are very supportive. No other needs at this time. Thanks writer for the call.  How are your symptoms? (Red Flag symptoms escalate to triage hotline per guidelines): Improved  Do you feel your condition is stable enough to be safe at home until your provider visit?: Yes  Does the patient have their discharge instructions? : Yes  Does the patient have questions regarding their discharge instructions? : No  Were you started on any new medications or were there changes to any of your previous medications? : Yes  Does the patient have all of their medications?: Yes  Do you have " questions regarding any of your medications? : No  Do you have all of your needed medical supplies or equipment (DME)?  (i.e. oxygen tank, CPAP, cane, etc.): Yes  Discharge follow-up appointment scheduled within 14 calendar days? : Yes  Discharge Follow Up Appointment Date: 05/03/24  Discharge Follow Up Appointment Scheduled with?: Specialty Care Provider (PHP program)    Post-op ([facility] CTA Only)  If the patient had a surgery or procedure, do they have any questions for a nurse?: No    Post-op (Clinicians Only)  Did the patient have surgery or a procedure: No      PLAN                                                      Outpatient Plan:  Health Care Follow-up:  Partial Hospitalization Program Intake: Virtual - 5/2 at 8am  Program: [facility]  Ph: [phone]  -Please work with this program regarding virtual intake. You will begin the first day of in person programmatic care on  Friday 5/3.  Psychiatry Appointment: Date/Time: Wednesday May 29th at 11am - Virtual  Psychiatrist Dr. Joe Parikh  Clinic: [facility]  Phone Number: [phone]  -Please schedule with your therapist prior to this appointment, you will need to be scheduled to see your therapist prior to  this appointment to keep it.    No future appointments.      For any urgent concerns, please contact our 24 hour nurse triage line: [phone] ([phone])         GILBERTO Jose

## 2024-05-06 ENCOUNTER — TELEPHONE (OUTPATIENT)
Dept: PHARMACY | Facility: OTHER | Age: 19
End: 2024-05-06
Payer: COMMERCIAL

## 2024-05-06 NOTE — TELEPHONE ENCOUNTER
MTM referral from: Transitions of Care (recent hospital discharge or ED visit)    MTM referral outreach attempt #2 on May 6, 2024 at 9:00 AM      Outcome: Patient not reachable after several attempts, sent mySBX message    Use [employer] (mark) for the carrier/Plan on the flowsheet      [employer] Message Sent    Corazon Dietrich CPhT  MTM

## 2025-03-13 ENCOUNTER — HOSPITAL ENCOUNTER (EMERGENCY)
Facility: CLINIC | Age: 20
Discharge: HOME | End: 2025-03-13
Attending: FAMILY MEDICINE
Payer: COMMERCIAL

## 2025-03-13 VITALS
DIASTOLIC BLOOD PRESSURE: 113 MMHG | HEART RATE: 90 BPM | BODY MASS INDEX: 15.73 KG/M2 | SYSTOLIC BLOOD PRESSURE: 152 MMHG | TEMPERATURE: 98.4 F | OXYGEN SATURATION: 99 % | WEIGHT: 86 LBS | RESPIRATION RATE: 16 BRPM

## 2025-03-13 DIAGNOSIS — R63.0 ANOREXIA: ICD-10-CM

## 2025-03-13 DIAGNOSIS — F32.A DEPRESSION, UNSPECIFIED DEPRESSION TYPE: ICD-10-CM

## 2025-03-13 DIAGNOSIS — R23.0 BLUISH SKIN DISCOLORATION: ICD-10-CM

## 2025-03-13 LAB
ALBUMIN SERPL BCG-MCNC: 4.9 G/DL (ref 3.5–5.2)
ALP SERPL-CCNC: 59 U/L (ref 40–150)
ALT SERPL W P-5'-P-CCNC: 15 U/L (ref 0–50)
ANION GAP SERPL CALCULATED.3IONS-SCNC: 15 MMOL/L (ref 7–15)
AST SERPL W P-5'-P-CCNC: 26 U/L (ref 0–45)
ATRIAL RATE - MUSE: 83 BPM
BASOPHILS # BLD AUTO: 0 10E3/UL (ref 0–0.2)
BASOPHILS NFR BLD AUTO: 0 %
BILIRUB SERPL-MCNC: 0.5 MG/DL
BUN SERPL-MCNC: 9.3 MG/DL (ref 6–20)
CALCIUM SERPL-MCNC: 9.6 MG/DL (ref 8.8–10.4)
CHLORIDE SERPL-SCNC: 102 MMOL/L (ref 98–107)
CREAT SERPL-MCNC: 0.67 MG/DL (ref 0.51–0.95)
DIASTOLIC BLOOD PRESSURE - MUSE: NORMAL MMHG
EGFRCR SERPLBLD CKD-EPI 2021: >90 ML/MIN/1.73M2
EOSINOPHIL # BLD AUTO: 0 10E3/UL (ref 0–0.7)
EOSINOPHIL NFR BLD AUTO: 0 %
ERYTHROCYTE [DISTWIDTH] IN BLOOD BY AUTOMATED COUNT: 12 % (ref 10–15)
GLUCOSE SERPL-MCNC: 112 MG/DL (ref 70–99)
HCO3 SERPL-SCNC: 25 MMOL/L (ref 22–29)
HCT VFR BLD AUTO: 39 % (ref 35–47)
HGB BLD-MCNC: 13.2 G/DL (ref 11.7–15.7)
HOLD SPECIMEN: NORMAL
HOLD SPECIMEN: NORMAL
IMM GRANULOCYTES # BLD: 0 10E3/UL
IMM GRANULOCYTES NFR BLD: 0 %
INTERPRETATION ECG - MUSE: NORMAL
LYMPHOCYTES # BLD AUTO: 1.6 10E3/UL (ref 0.8–5.3)
LYMPHOCYTES NFR BLD AUTO: 26 %
MCH RBC QN AUTO: 30.6 PG (ref 26.5–33)
MCHC RBC AUTO-ENTMCNC: 33.8 G/DL (ref 31.5–36.5)
MCV RBC AUTO: 90 FL (ref 78–100)
MONOCYTES # BLD AUTO: 0.4 10E3/UL (ref 0–1.3)
MONOCYTES NFR BLD AUTO: 7 %
NEUTROPHILS # BLD AUTO: 4.1 10E3/UL (ref 1.6–8.3)
NEUTROPHILS NFR BLD AUTO: 68 %
NRBC # BLD AUTO: 0 10E3/UL
NRBC BLD AUTO-RTO: 0 /100
P AXIS - MUSE: 62 DEGREES
PLATELET # BLD AUTO: 337 10E3/UL (ref 150–450)
POTASSIUM SERPL-SCNC: 3.9 MMOL/L (ref 3.4–5.3)
PR INTERVAL - MUSE: 142 MS
PROT SERPL-MCNC: 7.9 G/DL (ref 6.4–8.3)
QRS DURATION - MUSE: 84 MS
QT - MUSE: 354 MS
QTC - MUSE: 415 MS
R AXIS - MUSE: 84 DEGREES
RBC # BLD AUTO: 4.32 10E6/UL (ref 3.8–5.2)
SODIUM SERPL-SCNC: 142 MMOL/L (ref 135–145)
SYSTOLIC BLOOD PRESSURE - MUSE: NORMAL MMHG
T AXIS - MUSE: 47 DEGREES
VENTRICULAR RATE- MUSE: 83 BPM
WBC # BLD AUTO: 6.1 10E3/UL (ref 4–11)

## 2025-03-13 PROCEDURE — 99284 EMERGENCY DEPT VISIT MOD MDM: CPT | Performed by: FAMILY MEDICINE

## 2025-03-13 PROCEDURE — 82040 ASSAY OF SERUM ALBUMIN: CPT | Performed by: FAMILY MEDICINE

## 2025-03-13 PROCEDURE — 85025 COMPLETE CBC W/AUTO DIFF WBC: CPT | Performed by: FAMILY MEDICINE

## 2025-03-13 PROCEDURE — 93010 ELECTROCARDIOGRAM REPORT: CPT | Performed by: FAMILY MEDICINE

## 2025-03-13 PROCEDURE — 99284 EMERGENCY DEPT VISIT MOD MDM: CPT

## 2025-03-13 PROCEDURE — 93005 ELECTROCARDIOGRAM TRACING: CPT

## 2025-03-13 PROCEDURE — 82310 ASSAY OF CALCIUM: CPT | Performed by: FAMILY MEDICINE

## 2025-03-13 PROCEDURE — 82247 BILIRUBIN TOTAL: CPT | Performed by: FAMILY MEDICINE

## 2025-03-13 PROCEDURE — 36415 COLL VENOUS BLD VENIPUNCTURE: CPT | Performed by: FAMILY MEDICINE

## 2025-03-13 ASSESSMENT — COLUMBIA-SUICIDE SEVERITY RATING SCALE - C-SSRS
2. HAVE YOU ACTUALLY HAD ANY THOUGHTS OF KILLING YOURSELF IN THE PAST MONTH?: NO
1. IN THE PAST MONTH, HAVE YOU WISHED YOU WERE DEAD OR WISHED YOU COULD GO TO SLEEP AND NOT WAKE UP?: NO
6. HAVE YOU EVER DONE ANYTHING, STARTED TO DO ANYTHING, OR PREPARED TO DO ANYTHING TO END YOUR LIFE?: NO

## 2025-03-13 ASSESSMENT — ACTIVITIES OF DAILY LIVING (ADL): ADLS_ACUITY_SCORE: 41

## 2025-03-14 NOTE — ED NOTES
Patient asked writer to speak to me alone, boyfriend left room.  Patient stated that her parents have been out of town for the week and that she is really struggling with her eating disorder.  Patient worried about her heart.  Patient asked writer to have boyfriend either wait in the lobby or go home.  Patient very tearful in room.

## 2025-03-14 NOTE — DISCHARGE INSTRUCTIONS
I placed a referral to mental health services so that you can have an assessment for an eating disorder.  You can return anytime if you feel like you cannot manage her symptoms or need additional help.  Your blood tests and EKG today are normal.

## 2025-03-14 NOTE — ED TRIAGE NOTES
"Blue discoloration of skin on arms and headache that started today.  Patient denies nausea/vomiting.  Patient's boyfriend stated that patient told him that her heart started beating fast last night and \"she passed out\".  Patient denies feeling dizzy/lightheaded today.     Triage Assessment (Adult)       Row Name 03/13/25 7114          Triage Assessment    Airway WDL WDL        Respiratory WDL    Respiratory WDL WDL        Skin Circulation/Temperature WDL    Skin Circulation/Temperature WDL WDL        Cardiac WDL    Cardiac WDL WDL        Peripheral/Neurovascular WDL    Peripheral Neurovascular WDL WDL        Cognitive/Neuro/Behavioral WDL    Cognitive/Neuro/Behavioral WDL WDL                     "

## 2025-03-14 NOTE — ED PROVIDER NOTES
"  History     Chief Complaint   Patient presents with    Skin Discoloration     HPI    Mariana Blake is a 19 year old female who comes in from home with male partner with concerns about blue discoloration of her skin.  It becomes clear that this is dye from her sweatshirt after we wiped off with some and alcohol prep.  However the patient is crying continuously during my evaluation.  When I ask her why she is crying she cannot really tell me.  She says she does not feel suicidal.  She said she feels safe in her home environment.  She says she \"just wants to go home.\"  After further investigation she admits to concerns about her eating.  She said she \"does not eat.\"  When asked her why she cannot really tell me.  I asked her if she is unsatisfied with her weight and wants to lose weight and she says she does not know.  She declines to talk with one of her mental health therapists.  She is concerned about her heart because she does not eat.  Yesterday she had a near syncopal episode that she attributed to not eating all day and this is part of the concern about her heart.  She does have significant past mental health problems with anxiety and depression and has had episodes of suicidal ideation in the past.  He had been in partial hospitalization program at [facility] until last May.  She is willing to this day and have an EKG and lab studies.  She is not willing to talk to the [facility] therapists.    Allergies:  No Known Allergies    Problem List:    Patient Active Problem List    Diagnosis Date Noted    Suicidal ideation 04/25/2024     Priority: Medium    Major depressive disorder with current active episode, unspecified depression episode severity, unspecified whether recurrent 04/25/2024     Priority: Medium    Major depression, recurrent 04/12/2024     Priority: Medium    Generalized anxiety disorder 04/12/2024     Priority: Medium    Suicide ideation 04/12/2024     Priority: Medium    Inflammation of eyelid " 09/09/2006     Priority: Medium     Problem list name updated by automated process. Provider to review          Past Medical History:    Past Medical History:   Diagnosis Date    NO ACTIVE PROBLEMS        Past Surgical History:    Past Surgical History:   Procedure Laterality Date    NO HISTORY OF SURGERY         Family History:    Family History   Problem Relation Age of Onset    Diabetes Father         type one       Social History:  Marital Status:  Single [1]  Social History     Tobacco Use    Smoking status: Never     Passive exposure: Never    Smokeless tobacco: Never   Vaping Use    Vaping status: Never Used   Substance Use Topics    Alcohol use: No    Drug use: No        Medications:    cholecalciferol (VITAMIN D3) 125 mcg (5000 units) capsule  desvenlafaxine (PRISTIQ) 50 MG 24 hr tablet  gabapentin (NEURONTIN) 300 MG capsule  QUEtiapine (SEROQUEL) 25 MG tablet  senna-docusate (SENOKOT-S/PERICOLACE) 8.6-50 MG tablet          Review of Systems  All other systems are reviewed and are negative    Physical Exam   BP: (!) 152/113  Pulse: 90  Temp: 98.4  F (36.9  C)  Resp: 16  Weight: 43.5 kg (96 lb)  SpO2: 99 %      Physical Exam    Nursing note and vitals were reviewed.  Constitutional: Awake and alert, poorly nourished appearing 19-year-old in no apparent discomfort, who does not appear acutely ill, but appears depressed and is tearful throughout the interview although she answers questions appropriately and cooperates with examination.  HEENT: Speech is fluent.  Voice quality is normal.  PERRL.  EOMI.   Neck: Freely mobile.  Cardiovascular: Cardiac examination reveals normal heart rate and regular rhythm without murmur.  Pulmonary/Chest: Breathing is unlabored.  Breath sounds are clear and equal bilaterally.  There no retractions, tachypnea, rales, wheezes, or rhonchi.  Abdomen: Soft, nontender, no HSM or masses rebound or guarding.  Musculoskeletal: Extremities are warm and well-perfused and without  edema  Neurological: Alert, oriented, thought content logical, coherent   Skin: Warm, dry, no rashes.  No discoloration of the skin on her forearms back and chest.  This wiped off with an alcohol prep and is blue dye.  Psychiatric: Affect depressed.    ED Course        Procedures              EKG Interpretation:      Interpreted by eRuben Mclean MD  Time reviewed: 22:22  Symptoms at time of EKG: none   Rhythm: normal sinus   Rate: normal  Axis: normal  Ectopy: none  Conduction: normal  ST Segments/ T Waves: No ST-T wave changes  Q Waves: none  Comparison to prior: Unchanged from 4/12/24    Clinical Impression: normal EKG      Critical Care time:  none     None         Results for orders placed or performed during the hospital encounter of 03/13/25 (from the past 24 hours)   CBC with platelets, differential    Narrative    The following orders were created for panel order CBC with platelets, differential.  Procedure                               Abnormality         Status                     ---------                               -----------         ------                     CBC with platelets and ...[[id number]]                      Final result                 Please view results for these tests on the individual orders.   Comprehensive metabolic panel   Result Value Ref Range    Sodium 142 135 - 145 mmol/L    Potassium 3.9 3.4 - 5.3 mmol/L    Carbon Dioxide (CO2) 25 22 - 29 mmol/L    Anion Gap 15 7 - 15 mmol/L    Urea Nitrogen 9.3 6.0 - 20.0 mg/dL    Creatinine 0.67 0.51 - 0.95 mg/dL    GFR Estimate >90 >60 mL/min/1.73m2    Calcium 9.6 8.8 - 10.4 mg/dL    Chloride 102 98 - 107 mmol/L    Glucose 112 (H) 70 - 99 mg/dL    Alkaline Phosphatase 59 40 - 150 U/L    AST 26 0 - 45 U/L    ALT 15 0 - 50 U/L    Protein Total 7.9 6.4 - 8.3 g/dL    Albumin 4.9 3.5 - 5.2 g/dL    Bilirubin Total 0.5 <=1.2 mg/dL   [place] Draw    Narrative    The following orders were created for panel order [place] Draw.  Procedure                                Abnormality         Status                     ---------                               -----------         ------                     Extra Blue Top Tube[[id number]]                             In process                 Extra Red Top Tube[[id number]]                              In process                   Please view results for these tests on the individual orders.   CBC with platelets and differential   Result Value Ref Range    WBC Count 6.1 4.0 - 11.0 10e3/uL    RBC Count 4.32 3.80 - 5.20 10e6/uL    Hemoglobin 13.2 11.7 - 15.7 g/dL    Hematocrit 39.0 35.0 - 47.0 %    MCV 90 78 - 100 fL    MCH 30.6 26.5 - 33.0 pg    MCHC 33.8 31.5 - 36.5 g/dL    RDW 12.0 10.0 - 15.0 %    Platelet Count 337 150 - 450 10e3/uL    % Neutrophils 68 %    % Lymphocytes 26 %    % Monocytes 7 %    % Eosinophils 0 %    % Basophils 0 %    % Immature Granulocytes 0 %    NRBCs per 100 WBC 0 <1 /100    Absolute Neutrophils 4.1 1.6 - 8.3 10e3/uL    Absolute Lymphocytes 1.6 0.8 - 5.3 10e3/uL    Absolute Monocytes 0.4 0.0 - 1.3 10e3/uL    Absolute Eosinophils 0.0 0.0 - 0.7 10e3/uL    Absolute Basophils 0.0 0.0 - 0.2 10e3/uL    Absolute Immature Granulocytes 0.0 <=0.4 10e3/uL    Absolute NRBCs 0.0 10e3/uL   EKG 12 lead   Result Value Ref Range    Systolic Blood Pressure  mmHg    Diastolic Blood Pressure  mmHg    Ventricular Rate 83 BPM    Atrial Rate 83 BPM    HI Interval 142 ms    QRS Duration 84 ms     ms    QTc 415 ms    P Axis 62 degrees    R AXIS 84 degrees    T Axis 47 degrees    Interpretation ECG       Sinus rhythm  Normal ECG  No previous ECGs available         Medications - No data to display    Assessments & Plan (with Medical Decision Making)     19-year-old female with a history of anxiety and depression presented with blue discoloration of her skin.  This proved to be clothing diet.  However she was crying throughout the evaluation.  She repeatedly denied feeling unsafe at home and denied suicidal ideation  "and declined to speak to [facility] therapist.  Became clear she has a concerned about her eating and says she does not eat.  When asked if she thinks she has an eating disorder she says she does not know.  She does see a therapist.  She sees a therapist twice a week.  I asked her if she is talked about her eating and she has.  I asked if her therapist is concerned about her having an eating disorder and she said that her therapist \"does not like labels.\"  She was agreeable to having an EKG and blood tests and her EKG CBC and blood chemistries were normal.  I told her I would place a referral to mental health for an evaluation for an eating disorder.  I advised her she can return at any time if she cannot manage her symptoms or feels she needs additional help or feels like she might self-harm.    I have reviewed the nursing notes.    I have reviewed the findings, diagnosis, plan and need for follow up with the patient.         New Prescriptions    No medications on file       Final diagnoses:   Anorexia   Bluish skin discoloration   Depression, unspecified depression type       3/13/2025   [facility] EMERGENCY DEPT       Reuben Mclaen MD  03/13/25 [id number]    "

## 2025-03-15 LAB
ATRIAL RATE - MUSE: 83 BPM
DIASTOLIC BLOOD PRESSURE - MUSE: NORMAL MMHG
INTERPRETATION ECG - MUSE: NORMAL
P AXIS - MUSE: 62 DEGREES
PR INTERVAL - MUSE: 142 MS
QRS DURATION - MUSE: 84 MS
QT - MUSE: 354 MS
QTC - MUSE: 415 MS
R AXIS - MUSE: 84 DEGREES
SYSTOLIC BLOOD PRESSURE - MUSE: NORMAL MMHG
T AXIS - MUSE: 47 DEGREES
VENTRICULAR RATE- MUSE: 83 BPM

## 2025-04-01 LAB
GO4PGX COMMENTS: NORMAL
GO4PGX DISCLAIMER: NORMAL
GO4PGX LIMITATIONS: NORMAL
GO4PGX METHODOLOGY: NORMAL
LAB DIRECTOR RESULTS: NORMAL

## 2025-04-19 ENCOUNTER — HEALTH MAINTENANCE LETTER (OUTPATIENT)
Age: 20
End: 2025-04-19

## 2025-06-10 NOTE — TELEPHONE ENCOUNTER
Patient Quality Outreach    Patient is due for the following:   Depression  -  PHQ-9 needed      Topic Date Due    COVID-19 Vaccine (1) Never done    HPV Vaccine (1 - 2-dose series) Never done    Flu Vaccine (1) 09/01/2023       Next Steps:   Schedule a Adult Preventative  Patient was assigned appropriate questionnaire to complete    Type of outreach:    Sent [employer] message.    Next Steps:  Reach out within 90 days via Letter.    Max number of attempts reached: No. Will try again in 90 days if patient still on fail list.    Questions for provider review:    None           Elie De Paz, CMA         Previously negative